# Patient Record
Sex: FEMALE | Race: BLACK OR AFRICAN AMERICAN | URBAN - METROPOLITAN AREA
[De-identification: names, ages, dates, MRNs, and addresses within clinical notes are randomized per-mention and may not be internally consistent; named-entity substitution may affect disease eponyms.]

---

## 2017-04-06 ENCOUNTER — HISTORICAL (OUTPATIENT)
Dept: ADMINISTRATIVE | Facility: HOSPITAL | Age: 82
End: 2017-04-06

## 2018-04-09 ENCOUNTER — HISTORICAL (OUTPATIENT)
Dept: ADMINISTRATIVE | Facility: HOSPITAL | Age: 83
End: 2018-04-09

## 2018-04-09 LAB
ABS NEUT (OLG): 5.91 X10(3)/MCL (ref 2.1–9.2)
ALBUMIN SERPL-MCNC: 3.9 GM/DL (ref 3.4–5)
ALBUMIN/GLOB SERPL: 1.3 RATIO (ref 1.1–2)
ALP SERPL-CCNC: 67 UNIT/L (ref 38–126)
ALT SERPL-CCNC: 24 UNIT/L (ref 12–78)
AST SERPL-CCNC: 24 UNIT/L (ref 15–37)
BASOPHILS # BLD AUTO: 0 X10(3)/MCL (ref 0–0.2)
BASOPHILS NFR BLD AUTO: 0 %
BILIRUB SERPL-MCNC: 0.5 MG/DL (ref 0.2–1)
BILIRUBIN DIRECT+TOT PNL SERPL-MCNC: 0.1 MG/DL (ref 0–0.5)
BILIRUBIN DIRECT+TOT PNL SERPL-MCNC: 0.4 MG/DL (ref 0–0.8)
BUN SERPL-MCNC: 16 MG/DL (ref 7–18)
CALCIUM SERPL-MCNC: 8.9 MG/DL (ref 8.5–10.1)
CHLORIDE SERPL-SCNC: 106 MMOL/L (ref 98–107)
CHOLEST SERPL-MCNC: 191 MG/DL (ref 0–200)
CHOLEST/HDLC SERPL: 2.9 {RATIO} (ref 0–4)
CO2 SERPL-SCNC: 24 MMOL/L (ref 21–32)
CREAT SERPL-MCNC: 1.57 MG/DL (ref 0.55–1.02)
EOSINOPHIL # BLD AUTO: 0.1 X10(3)/MCL (ref 0–0.9)
EOSINOPHIL NFR BLD AUTO: 2 %
ERYTHROCYTE [DISTWIDTH] IN BLOOD BY AUTOMATED COUNT: 14.6 % (ref 11.5–17)
FOLATE SERPL-MCNC: 8 NG/ML (ref 3.1–17.5)
GLOBULIN SER-MCNC: 3.1 GM/DL (ref 2.4–3.5)
GLUCOSE SERPL-MCNC: 103 MG/DL (ref 74–106)
HCT VFR BLD AUTO: 41 % (ref 37–47)
HDLC SERPL-MCNC: 66 MG/DL (ref 35–60)
HGB BLD-MCNC: 12.8 GM/DL (ref 12–16)
IRON SATN MFR SERPL: 34.1 % (ref 20–50)
IRON SERPL-MCNC: 91 MCG/DL (ref 50–175)
LDLC SERPL CALC-MCNC: 104 MG/DL (ref 0–129)
LYMPHOCYTES # BLD AUTO: 2.4 X10(3)/MCL (ref 0.6–4.6)
LYMPHOCYTES NFR BLD AUTO: 27 %
MCH RBC QN AUTO: 28 PG (ref 27–31)
MCHC RBC AUTO-ENTMCNC: 31.2 GM/DL (ref 33–36)
MCV RBC AUTO: 89.7 FL (ref 80–94)
MONOCYTES # BLD AUTO: 0.4 X10(3)/MCL (ref 0.1–1.3)
MONOCYTES NFR BLD AUTO: 4 %
NEUTROPHILS # BLD AUTO: 5.91 X10(3)/MCL (ref 2.1–9.2)
NEUTROPHILS NFR BLD AUTO: 66 %
PLATELET # BLD AUTO: 293 X10(3)/MCL (ref 130–400)
PMV BLD AUTO: 9.4 FL (ref 9.4–12.4)
POTASSIUM SERPL-SCNC: 4.1 MMOL/L (ref 3.5–5.1)
PROT SERPL-MCNC: 7 GM/DL (ref 6.4–8.2)
RBC # BLD AUTO: 4.57 X10(6)/MCL (ref 4.2–5.4)
SODIUM SERPL-SCNC: 139 MMOL/L (ref 136–145)
TIBC SERPL-MCNC: 267 MCG/DL (ref 250–450)
TRANSFERRIN SERPL-MCNC: 233 MG/DL (ref 200–360)
TRIGL SERPL-MCNC: 106 MG/DL (ref 30–150)
VIT B12 SERPL-MCNC: 1152 PG/ML (ref 193–986)
VLDLC SERPL CALC-MCNC: 21 MG/DL
WBC # SPEC AUTO: 9 X10(3)/MCL (ref 4.5–11.5)

## 2018-05-03 ENCOUNTER — HISTORICAL (OUTPATIENT)
Dept: ADMINISTRATIVE | Facility: HOSPITAL | Age: 83
End: 2018-05-03

## 2018-05-03 LAB
APPEARANCE, UA: CLEAR
BACTERIA #/AREA URNS AUTO: ABNORMAL /HPF
BILIRUB UR QL STRIP: NEGATIVE
COLOR UR: YELLOW
GLUCOSE (UA): NEGATIVE
HGB UR QL STRIP: ABNORMAL
KETONES UR QL STRIP: NEGATIVE
LEUKOCYTE ESTERASE UR QL STRIP: ABNORMAL
NITRITE UR QL STRIP.AUTO: NEGATIVE
PH UR STRIP: 6 [PH] (ref 5–9)
PROT UR QL STRIP: NEGATIVE
RBC #/AREA URNS HPF: ABNORMAL /[HPF]
SP GR UR STRIP: 1 (ref 1–1.03)
SQUAMOUS EPITHELIAL, UA: ABNORMAL
UROBILINOGEN UR STRIP-ACNC: 0.2
WBC #/AREA URNS AUTO: ABNORMAL /[HPF]

## 2018-06-13 ENCOUNTER — HISTORICAL (OUTPATIENT)
Dept: ADMINISTRATIVE | Facility: HOSPITAL | Age: 83
End: 2018-06-13

## 2018-06-14 ENCOUNTER — HISTORICAL (OUTPATIENT)
Dept: ADMINISTRATIVE | Facility: HOSPITAL | Age: 83
End: 2018-06-14

## 2018-06-14 LAB
APPEARANCE, UA: CLEAR
BACTERIA #/AREA URNS AUTO: ABNORMAL /HPF
BILIRUB UR QL STRIP: NEGATIVE
COLOR UR: YELLOW
GLUCOSE (UA): NEGATIVE
HGB UR QL STRIP: NEGATIVE
KETONES UR QL STRIP: NEGATIVE
LEUKOCYTE ESTERASE UR QL STRIP: ABNORMAL
NITRITE UR QL STRIP.AUTO: NEGATIVE
PH UR STRIP: 5.5 [PH] (ref 5–9)
PROT UR QL STRIP: NEGATIVE
RBC #/AREA URNS HPF: 2 /HPF (ref 0–2)
SP GR UR STRIP: 1.01 (ref 1–1.03)
SQUAMOUS EPITHELIAL, UA: 4 /HPF (ref 0–4)
UROBILINOGEN UR STRIP-ACNC: 0.2
WBC #/AREA URNS AUTO: 8 /HPF (ref 0–3)

## 2018-06-16 LAB — FINAL CULTURE: NO GROWTH

## 2018-08-03 ENCOUNTER — HISTORICAL (OUTPATIENT)
Dept: ADMINISTRATIVE | Facility: HOSPITAL | Age: 83
End: 2018-08-03

## 2018-08-03 LAB
APPEARANCE, UA: CLEAR
BILIRUB UR QL STRIP: NEGATIVE
COLOR UR: YELLOW
GLUCOSE (UA): NEGATIVE
HGB UR QL STRIP: NEGATIVE
KETONES UR QL STRIP: NEGATIVE
LEUKOCYTE ESTERASE UR QL STRIP: NEGATIVE
NITRITE UR QL STRIP: NEGATIVE
PH UR STRIP: 6.5 [PH]
PROT UR QL STRIP: NEGATIVE
SP GR UR STRIP: 1.01
UROBILINOGEN UR STRIP-ACNC: 0.2 EU/DL

## 2018-08-06 LAB — FINAL CULTURE: NO GROWTH

## 2018-12-06 ENCOUNTER — HISTORICAL (OUTPATIENT)
Dept: ADMINISTRATIVE | Facility: HOSPITAL | Age: 83
End: 2018-12-06

## 2018-12-06 LAB
ABS NEUT (OLG): 2.48 X10(3)/MCL (ref 2.1–9.2)
ALBUMIN SERPL-MCNC: 3.7 GM/DL (ref 3.4–5)
ALBUMIN/GLOB SERPL: 1.4 RATIO (ref 1.1–2)
ALP SERPL-CCNC: 53 UNIT/L (ref 38–126)
ALT SERPL-CCNC: 29 UNIT/L (ref 12–78)
AST SERPL-CCNC: 28 UNIT/L (ref 15–37)
BASOPHILS # BLD AUTO: 0.1 X10(3)/MCL (ref 0–0.2)
BASOPHILS NFR BLD AUTO: 1 %
BILIRUB SERPL-MCNC: 0.3 MG/DL (ref 0.2–1)
BILIRUBIN DIRECT+TOT PNL SERPL-MCNC: 0.1 MG/DL (ref 0–0.5)
BILIRUBIN DIRECT+TOT PNL SERPL-MCNC: 0.2 MG/DL (ref 0–0.8)
BUN SERPL-MCNC: 21 MG/DL (ref 7–18)
CALCIUM SERPL-MCNC: 8.7 MG/DL (ref 8.5–10.1)
CHLORIDE SERPL-SCNC: 107 MMOL/L (ref 98–107)
CHOLEST SERPL-MCNC: 207 MG/DL (ref 0–200)
CHOLEST/HDLC SERPL: 3.6 {RATIO} (ref 0–4)
CO2 SERPL-SCNC: 28 MMOL/L (ref 21–32)
CREAT SERPL-MCNC: 1.32 MG/DL (ref 0.55–1.02)
EOSINOPHIL # BLD AUTO: 0.3 X10(3)/MCL (ref 0–0.9)
EOSINOPHIL NFR BLD AUTO: 5 %
ERYTHROCYTE [DISTWIDTH] IN BLOOD BY AUTOMATED COUNT: 14.1 % (ref 11.5–17)
GLOBULIN SER-MCNC: 2.6 GM/DL (ref 2.4–3.5)
GLUCOSE SERPL-MCNC: 80 MG/DL (ref 74–106)
HCT VFR BLD AUTO: 38.9 % (ref 37–47)
HDLC SERPL-MCNC: 58 MG/DL (ref 35–60)
HGB BLD-MCNC: 11.8 GM/DL (ref 12–16)
IRON SATN MFR SERPL: 42.5 % (ref 20–50)
IRON SERPL-MCNC: 97 MCG/DL (ref 50–175)
LDLC SERPL CALC-MCNC: 125 MG/DL (ref 0–129)
LYMPHOCYTES # BLD AUTO: 3.4 X10(3)/MCL (ref 0.6–4.6)
LYMPHOCYTES NFR BLD AUTO: 52 %
MCH RBC QN AUTO: 28 PG (ref 27–31)
MCHC RBC AUTO-ENTMCNC: 30.3 GM/DL (ref 33–36)
MCV RBC AUTO: 92.4 FL (ref 80–94)
MONOCYTES # BLD AUTO: 0.4 X10(3)/MCL (ref 0.1–1.3)
MONOCYTES NFR BLD AUTO: 5 %
NEUTROPHILS # BLD AUTO: 2.48 X10(3)/MCL (ref 2.1–9.2)
NEUTROPHILS NFR BLD AUTO: 37 %
PLATELET # BLD AUTO: 195 X10(3)/MCL (ref 130–400)
PMV BLD AUTO: 9.7 FL (ref 9.4–12.4)
POTASSIUM SERPL-SCNC: 4.3 MMOL/L (ref 3.5–5.1)
PROT SERPL-MCNC: 6.3 GM/DL (ref 6.4–8.2)
RBC # BLD AUTO: 4.21 X10(6)/MCL (ref 4.2–5.4)
SODIUM SERPL-SCNC: 141 MMOL/L (ref 136–145)
TIBC SERPL-MCNC: 228 MCG/DL (ref 250–450)
TRANSFERRIN SERPL-MCNC: 175 MG/DL (ref 200–360)
TRIGL SERPL-MCNC: 118 MG/DL (ref 30–150)
TSH SERPL-ACNC: 3.05 MIU/L (ref 0.36–3.74)
VLDLC SERPL CALC-MCNC: 24 MG/DL
WBC # SPEC AUTO: 6.7 X10(3)/MCL (ref 4.5–11.5)

## 2019-02-04 ENCOUNTER — HISTORICAL (OUTPATIENT)
Dept: LAB | Facility: HOSPITAL | Age: 84
End: 2019-02-04

## 2019-02-04 LAB
APPEARANCE, UA: CLEAR
BACTERIA SPEC CULT: ABNORMAL
BILIRUB UR QL STRIP: NEGATIVE
COLOR UR: YELLOW
GLUCOSE (UA): NEGATIVE
HGB UR QL STRIP: NEGATIVE
KETONES UR QL STRIP: NEGATIVE
LEUKOCYTE ESTERASE UR QL STRIP: ABNORMAL
NITRITE UR QL STRIP: NEGATIVE
PH UR STRIP: 7.5 [PH] (ref 4.6–8)
PROT UR QL STRIP: NEGATIVE
RBC #/AREA URNS HPF: ABNORMAL /[HPF]
SP GR UR STRIP: 1.01 (ref 1–1.03)
SQUAMOUS EPITHELIAL, UA: ABNORMAL
UROBILINOGEN UR STRIP-ACNC: 0.2
WBC #/AREA URNS HPF: ABNORMAL /HPF

## 2020-08-12 ENCOUNTER — HISTORICAL (OUTPATIENT)
Dept: ADMINISTRATIVE | Facility: HOSPITAL | Age: 85
End: 2020-08-12

## 2020-08-12 LAB
ABS NEUT (OLG): 2.28 X10(3)/MCL (ref 2.1–9.2)
ALBUMIN SERPL-MCNC: 3.5 GM/DL (ref 3.4–4.8)
ALBUMIN/GLOB SERPL: 1.5 RATIO (ref 1.1–2)
ALP SERPL-CCNC: 45 UNIT/L (ref 40–150)
ALT SERPL-CCNC: 10 UNIT/L (ref 0–55)
APPEARANCE, UA: CLEAR
AST SERPL-CCNC: 20 UNIT/L (ref 5–34)
BACTERIA SPEC CULT: ABNORMAL /HPF
BASOPHILS # BLD AUTO: 0 X10(3)/MCL (ref 0–0.2)
BASOPHILS NFR BLD AUTO: 1 %
BILIRUB SERPL-MCNC: 0.3 MG/DL
BILIRUB UR QL STRIP: NEGATIVE
BILIRUBIN DIRECT+TOT PNL SERPL-MCNC: 0.1 MG/DL (ref 0–0.5)
BILIRUBIN DIRECT+TOT PNL SERPL-MCNC: 0.2 MG/DL (ref 0–0.8)
BUN SERPL-MCNC: 18.7 MG/DL (ref 9.8–20.1)
CALCIUM SERPL-MCNC: 8.5 MG/DL (ref 8.4–10.2)
CHLORIDE SERPL-SCNC: 106 MMOL/L (ref 98–111)
CHOLEST SERPL-MCNC: 213 MG/DL
CHOLEST/HDLC SERPL: 5 {RATIO} (ref 0–5)
CO2 SERPL-SCNC: 26 MMOL/L (ref 23–31)
COLOR UR: YELLOW
CREAT SERPL-MCNC: 1.53 MG/DL (ref 0.55–1.02)
EOSINOPHIL # BLD AUTO: 0.2 X10(3)/MCL (ref 0–0.9)
EOSINOPHIL NFR BLD AUTO: 3 %
ERYTHROCYTE [DISTWIDTH] IN BLOOD BY AUTOMATED COUNT: 13.3 % (ref 11.5–17)
GLOBULIN SER-MCNC: 2.3 GM/DL (ref 2.4–3.5)
GLUCOSE (UA): NEGATIVE
GLUCOSE SERPL-MCNC: 68 MG/DL (ref 75–121)
HCT VFR BLD AUTO: 35.5 % (ref 37–47)
HDLC SERPL-MCNC: 44 MG/DL (ref 35–60)
HGB BLD-MCNC: 10.7 GM/DL (ref 12–16)
HGB UR QL STRIP: ABNORMAL
KETONES UR QL STRIP: NEGATIVE
LDLC SERPL CALC-MCNC: 153 MG/DL (ref 50–140)
LEUKOCYTE ESTERASE UR QL STRIP: ABNORMAL
LYMPHOCYTES # BLD AUTO: 3.4 X10(3)/MCL (ref 0.6–4.6)
LYMPHOCYTES NFR BLD AUTO: 54 %
MCH RBC QN AUTO: 28.5 PG (ref 27–31)
MCHC RBC AUTO-ENTMCNC: 30.1 GM/DL (ref 33–36)
MCV RBC AUTO: 94.4 FL (ref 80–94)
MONOCYTES # BLD AUTO: 0.4 X10(3)/MCL (ref 0.1–1.3)
MONOCYTES NFR BLD AUTO: 6 %
NEUTROPHILS # BLD AUTO: 2.28 X10(3)/MCL (ref 2.1–9.2)
NEUTROPHILS NFR BLD AUTO: 36 %
NITRITE UR QL STRIP: NEGATIVE
PH UR STRIP: 5.5 [PH] (ref 5–9)
PLATELET # BLD AUTO: 184 X10(3)/MCL (ref 130–400)
PMV BLD AUTO: 10.4 FL (ref 9.4–12.4)
POTASSIUM SERPL-SCNC: 4.7 MMOL/L (ref 3.5–5.1)
PROT SERPL-MCNC: 5.8 GM/DL (ref 5.8–7.6)
PROT UR QL STRIP: NEGATIVE
RBC # BLD AUTO: 3.76 X10(6)/MCL (ref 4.2–5.4)
RBC #/AREA URNS HPF: 6 /HPF (ref 0–2)
SODIUM SERPL-SCNC: 139 MMOL/L (ref 132–146)
SP GR UR STRIP: 1.02 (ref 1–1.03)
SQUAMOUS EPITHELIAL, UA: ABNORMAL
TRIGL SERPL-MCNC: 79 MG/DL (ref 37–140)
UROBILINOGEN UR STRIP-ACNC: 0.2
VLDLC SERPL CALC-MCNC: 16 MG/DL
WBC # SPEC AUTO: 6.2 X10(3)/MCL (ref 4.5–11.5)
WBC #/AREA URNS HPF: 19 /HPF (ref 0–3)

## 2020-08-14 LAB — FINAL CULTURE: NORMAL

## 2021-02-06 ENCOUNTER — HISTORICAL (OUTPATIENT)
Dept: ADMINISTRATIVE | Facility: HOSPITAL | Age: 86
End: 2021-02-06

## 2021-02-06 LAB
APPEARANCE, UA: CLEAR
BACTERIA #/AREA URNS AUTO: ABNORMAL /HPF
BILIRUB UR QL STRIP: NEGATIVE
COLOR UR: YELLOW
GLUCOSE (UA): NEGATIVE
HGB UR QL STRIP: NEGATIVE
KETONES UR QL STRIP: NEGATIVE
LEUKOCYTE ESTERASE UR QL STRIP: ABNORMAL
NITRITE UR QL STRIP.AUTO: NEGATIVE
PH UR STRIP: 5 [PH] (ref 5–9)
PROT UR QL STRIP: NEGATIVE
RBC #/AREA URNS HPF: ABNORMAL /[HPF]
SP GR UR STRIP: 1.02 (ref 1–1.03)
SQUAMOUS EPITHELIAL, UA: ABNORMAL
UROBILINOGEN UR STRIP-ACNC: 0.2
WBC #/AREA URNS AUTO: ABNORMAL /HPF (ref 0–3)

## 2021-02-08 LAB — FINAL CULTURE: NO GROWTH

## 2021-04-03 ENCOUNTER — HISTORICAL (OUTPATIENT)
Dept: ADMINISTRATIVE | Facility: HOSPITAL | Age: 86
End: 2021-04-03

## 2021-04-03 LAB
APPEARANCE, UA: NORMAL
BACTERIA SPEC CULT: NORMAL /HPF
BILIRUB UR QL STRIP: NEGATIVE
COLOR UR: YELLOW
GLUCOSE (UA): NEGATIVE
HGB UR QL STRIP: NEGATIVE
KETONES UR QL STRIP: NEGATIVE
LEUKOCYTE ESTERASE UR QL STRIP: NEGATIVE
NITRITE UR QL STRIP: NEGATIVE
PH UR STRIP: 6 [PH] (ref 5–9)
PROT UR QL STRIP: NEGATIVE
RBC #/AREA URNS HPF: NORMAL /[HPF]
SP GR UR STRIP: 1.02 (ref 1–1.03)
SQUAMOUS EPITHELIAL, UA: NORMAL /HPF
UROBILINOGEN UR STRIP-ACNC: 0.2
WBC #/AREA URNS HPF: NORMAL /[HPF]

## 2021-04-05 LAB — FINAL CULTURE: NO GROWTH

## 2024-12-10 ENCOUNTER — TELEPHONE (OUTPATIENT)
Dept: FAMILY MEDICINE | Facility: CLINIC | Age: 89
End: 2024-12-10

## 2024-12-10 ENCOUNTER — OFFICE VISIT (OUTPATIENT)
Dept: FAMILY MEDICINE | Facility: CLINIC | Age: 89
End: 2024-12-10
Payer: MEDICARE

## 2024-12-10 ENCOUNTER — HOSPITAL ENCOUNTER (INPATIENT)
Facility: HOSPITAL | Age: 89
LOS: 3 days | Discharge: HOME | DRG: 603 | End: 2024-12-14
Attending: STUDENT IN AN ORGANIZED HEALTH CARE EDUCATION/TRAINING PROGRAM | Admitting: STUDENT IN AN ORGANIZED HEALTH CARE EDUCATION/TRAINING PROGRAM
Payer: MEDICARE

## 2024-12-10 VITALS
RESPIRATION RATE: 18 BRPM | OXYGEN SATURATION: 99 % | SYSTOLIC BLOOD PRESSURE: 154 MMHG | HEART RATE: 61 BPM | DIASTOLIC BLOOD PRESSURE: 72 MMHG | TEMPERATURE: 98 F | WEIGHT: 120 LBS | BODY MASS INDEX: 23.56 KG/M2 | HEIGHT: 60 IN

## 2024-12-10 DIAGNOSIS — G30.1 LATE ONSET ALZHEIMER DEMENTIA, UNSPECIFIED DEMENTIA SEVERITY, UNSPECIFIED WHETHER BEHAVIORAL, PSYCHOTIC, OR MOOD DISTURBANCE OR ANXIETY: ICD-10-CM

## 2024-12-10 DIAGNOSIS — M79.661 PAIN AND SWELLING OF RIGHT LOWER LEG: ICD-10-CM

## 2024-12-10 DIAGNOSIS — L03.115 CELLULITIS OF RIGHT FOOT: Primary | ICD-10-CM

## 2024-12-10 DIAGNOSIS — Z13.6 ENCOUNTER FOR LIPID SCREENING FOR CARDIOVASCULAR DISEASE: ICD-10-CM

## 2024-12-10 DIAGNOSIS — Z13.220 ENCOUNTER FOR LIPID SCREENING FOR CARDIOVASCULAR DISEASE: ICD-10-CM

## 2024-12-10 DIAGNOSIS — F02.80 LATE ONSET ALZHEIMER DEMENTIA, UNSPECIFIED DEMENTIA SEVERITY, UNSPECIFIED WHETHER BEHAVIORAL, PSYCHOTIC, OR MOOD DISTURBANCE OR ANXIETY: ICD-10-CM

## 2024-12-10 DIAGNOSIS — E87.5 HYPERKALEMIA: ICD-10-CM

## 2024-12-10 DIAGNOSIS — Z00.00 WELLNESS EXAMINATION: ICD-10-CM

## 2024-12-10 DIAGNOSIS — Z76.89 ENCOUNTER TO ESTABLISH CARE: ICD-10-CM

## 2024-12-10 DIAGNOSIS — I10 BENIGN ESSENTIAL HTN: ICD-10-CM

## 2024-12-10 DIAGNOSIS — Z13.29 SCREENING FOR THYROID DISORDER: ICD-10-CM

## 2024-12-10 DIAGNOSIS — M79.89 PAIN AND SWELLING OF RIGHT LOWER LEG: ICD-10-CM

## 2024-12-10 DIAGNOSIS — N18.4 STAGE 4 CHRONIC KIDNEY DISEASE: ICD-10-CM

## 2024-12-10 DIAGNOSIS — Z13.1 ENCOUNTER FOR SCREENING FOR DIABETES MELLITUS: ICD-10-CM

## 2024-12-10 DIAGNOSIS — M79.604 RIGHT LEG PAIN: ICD-10-CM

## 2024-12-10 DIAGNOSIS — E78.2 MIXED HYPERLIPIDEMIA: ICD-10-CM

## 2024-12-10 DIAGNOSIS — L03.115 CELLULITIS OF FOOT, RIGHT: ICD-10-CM

## 2024-12-10 PROBLEM — G30.9 ALZHEIMER'S DEMENTIA: Status: ACTIVE | Noted: 2024-12-10

## 2024-12-10 LAB
ALBUMIN SERPL-MCNC: 3.9 G/DL (ref 3.4–4.8)
ALBUMIN/GLOB SERPL: 1.2 RATIO (ref 1.1–2)
ALP SERPL-CCNC: 69 UNIT/L (ref 40–150)
ALT SERPL-CCNC: 9 UNIT/L (ref 0–55)
ANION GAP SERPL CALC-SCNC: 4 MEQ/L
AST SERPL-CCNC: 25 UNIT/L (ref 5–34)
BASOPHILS # BLD AUTO: 0.04 X10(3)/MCL
BASOPHILS NFR BLD AUTO: 0.5 %
BILIRUB SERPL-MCNC: 0.3 MG/DL
BUN SERPL-MCNC: 34.2 MG/DL (ref 9.8–20.1)
CALCIUM SERPL-MCNC: 8 MG/DL (ref 8.4–10.2)
CHLORIDE SERPL-SCNC: 110 MMOL/L (ref 98–111)
CO2 SERPL-SCNC: 24 MMOL/L (ref 23–31)
CREAT SERPL-MCNC: 1.96 MG/DL (ref 0.55–1.02)
CREAT/UREA NIT SERPL: 17
EOSINOPHIL # BLD AUTO: 0.12 X10(3)/MCL (ref 0–0.9)
EOSINOPHIL NFR BLD AUTO: 1.6 %
ERYTHROCYTE [DISTWIDTH] IN BLOOD BY AUTOMATED COUNT: 13.6 % (ref 11.5–17)
GFR SERPLBLD CREATININE-BSD FMLA CKD-EPI: 23 ML/MIN/1.73/M2
GLOBULIN SER-MCNC: 3.2 GM/DL (ref 2.4–3.5)
GLUCOSE SERPL-MCNC: 78 MG/DL (ref 75–121)
HCT VFR BLD AUTO: 33.9 % (ref 37–47)
HGB BLD-MCNC: 10.3 G/DL (ref 12–16)
IMM GRANULOCYTES # BLD AUTO: 0.02 X10(3)/MCL (ref 0–0.04)
IMM GRANULOCYTES NFR BLD AUTO: 0.3 %
LACTATE SERPL-SCNC: 0.9 MMOL/L (ref 0.5–2.2)
LYMPHOCYTES # BLD AUTO: 2.65 X10(3)/MCL (ref 0.6–4.6)
LYMPHOCYTES NFR BLD AUTO: 34.6 %
MCH RBC QN AUTO: 28.2 PG (ref 27–31)
MCHC RBC AUTO-ENTMCNC: 30.4 G/DL (ref 33–36)
MCV RBC AUTO: 92.9 FL (ref 80–94)
MONOCYTES # BLD AUTO: 0.54 X10(3)/MCL (ref 0.1–1.3)
MONOCYTES NFR BLD AUTO: 7 %
NEUTROPHILS # BLD AUTO: 4.3 X10(3)/MCL (ref 2.1–9.2)
NEUTROPHILS NFR BLD AUTO: 56 %
NRBC BLD AUTO-RTO: 0 %
PLATELET # BLD AUTO: 217 X10(3)/MCL (ref 130–400)
PMV BLD AUTO: 10 FL (ref 7.4–10.4)
POTASSIUM SERPL-SCNC: 4.6 MMOL/L (ref 3.5–5.1)
PROT SERPL-MCNC: 7.1 GM/DL (ref 5.8–7.6)
RBC # BLD AUTO: 3.65 X10(6)/MCL (ref 4.2–5.4)
SODIUM SERPL-SCNC: 138 MMOL/L (ref 136–145)
WBC # BLD AUTO: 7.67 X10(3)/MCL (ref 4.5–11.5)

## 2024-12-10 PROCEDURE — 87040 BLOOD CULTURE FOR BACTERIA: CPT | Performed by: PHYSICIAN ASSISTANT

## 2024-12-10 PROCEDURE — 25000003 PHARM REV CODE 250: Performed by: STUDENT IN AN ORGANIZED HEALTH CARE EDUCATION/TRAINING PROGRAM

## 2024-12-10 PROCEDURE — G0439 PPPS, SUBSEQ VISIT: HCPCS | Mod: ,,, | Performed by: STUDENT IN AN ORGANIZED HEALTH CARE EDUCATION/TRAINING PROGRAM

## 2024-12-10 PROCEDURE — G0378 HOSPITAL OBSERVATION PER HR: HCPCS

## 2024-12-10 PROCEDURE — 63600175 PHARM REV CODE 636 W HCPCS: Performed by: STUDENT IN AN ORGANIZED HEALTH CARE EDUCATION/TRAINING PROGRAM

## 2024-12-10 PROCEDURE — 80053 COMPREHEN METABOLIC PANEL: CPT | Performed by: PHYSICIAN ASSISTANT

## 2024-12-10 PROCEDURE — 96374 THER/PROPH/DIAG INJ IV PUSH: CPT

## 2024-12-10 PROCEDURE — 83605 ASSAY OF LACTIC ACID: CPT | Performed by: PHYSICIAN ASSISTANT

## 2024-12-10 PROCEDURE — 85025 COMPLETE CBC W/AUTO DIFF WBC: CPT | Performed by: PHYSICIAN ASSISTANT

## 2024-12-10 PROCEDURE — 99285 EMERGENCY DEPT VISIT HI MDM: CPT | Mod: 25

## 2024-12-10 RX ORDER — ACETAMINOPHEN 325 MG/1
650 TABLET ORAL EVERY 6 HOURS PRN
Status: DISCONTINUED | OUTPATIENT
Start: 2024-12-10 | End: 2024-12-14 | Stop reason: HOSPADM

## 2024-12-10 RX ORDER — SULFAMETHOXAZOLE AND TRIMETHOPRIM 200; 40 MG/5ML; MG/5ML
8 SUSPENSION ORAL EVERY 12 HOURS
Status: ON HOLD | COMMUNITY
End: 2024-12-14 | Stop reason: HOSPADM

## 2024-12-10 RX ORDER — CLINDAMYCIN HYDROCHLORIDE 150 MG/1
150 CAPSULE ORAL 3 TIMES DAILY
Status: ON HOLD | COMMUNITY
End: 2024-12-14 | Stop reason: HOSPADM

## 2024-12-10 RX ORDER — ONDANSETRON HYDROCHLORIDE 2 MG/ML
4 INJECTION, SOLUTION INTRAVENOUS EVERY 4 HOURS PRN
Status: DISCONTINUED | OUTPATIENT
Start: 2024-12-10 | End: 2024-12-14 | Stop reason: HOSPADM

## 2024-12-10 RX ORDER — CEFAZOLIN 2 G/1
2 INJECTION, POWDER, FOR SOLUTION INTRAMUSCULAR; INTRAVENOUS
Status: COMPLETED | OUTPATIENT
Start: 2024-12-10 | End: 2024-12-10

## 2024-12-10 RX ADMIN — CEFAZOLIN 2 G: 2 INJECTION, POWDER, FOR SOLUTION INTRAMUSCULAR; INTRAVENOUS at 01:12

## 2024-12-10 RX ADMIN — VANCOMYCIN HYDROCHLORIDE 1000 MG: 1 INJECTION, POWDER, LYOPHILIZED, FOR SOLUTION INTRAVENOUS at 02:12

## 2024-12-10 NOTE — FIRST PROVIDER EVALUATION
Medical screening examination initiated.  I have conducted a focused provider triage encounter, findings are as follows:    Brief history of present illness:  89-year-old female presents to ED for evaluation right foot pain and swelling worsening over the last 2 weeks.  Patient started antibiotics on José Manuel than.  Saw her PCP today and sent to ED evaluation.    Vitals:    12/10/24 1033   BP: (!) 148/72   BP Location: Left arm   Pulse: (!) 58   Resp: 18   Temp: 97.8 °F (36.6 °C)   TempSrc: Oral   SpO2: 100%   Weight: 54.4 kg (120 lb)   Height: 5' (1.524 m)       Pertinent physical exam:  Patient awake sitting wheelchair.    Brief workup plan:  Labs, blood cultures,, ultrasound    Preliminary workup initiated; this workup will be continued and followed by the physician or advanced practice provider that is assigned to the patient when roomed.

## 2024-12-10 NOTE — TELEPHONE ENCOUNTER
Called [facility] and talked to BEATRICE Cruz. I voiced pt is on her way for failed outpatient therapy of Cellulitis and stage 4 CKD. I also voiced her blood pressure from this morning and pulse. She voiced understanding and thanks!

## 2024-12-10 NOTE — PROGRESS NOTES
"Pharmacokinetic Initial Assessment: IV Vancomycin    Assessment/Plan:    Initiate intravenous vancomycin with loading dose of 1000 mg once with subsequent doses when random concentrations are less than 20 mcg/mL  Desired empiric serum trough concentration is 10 to 20 mcg/mL  Draw vancomycin random level on 12/11 at 0430.  Pharmacy will continue to follow and monitor vancomycin.      Please contact pharmacy at extension [phone] with any questions regarding this assessment.     Thank you for the consult,   Yola Aubrey       Patient brief summary:  Loly Ramirez is a 89 y.o. female initiated on antimicrobial therapy with IV Vancomycin for treatment of suspected skin & soft tissue infection    Drug Allergies:   Review of patient's allergies indicates:   Allergen Reactions    Penicillins        Actual Body Weight:   54.4 kg    Renal Function:   Estimated Creatinine Clearance: 12.3 mL/min (A) (based on SCr of 1.96 mg/dL (H)).,     Dialysis Method (if applicable):  CHLOE    CBC (last 72 hours):  Recent Labs   Lab Result Units 12/10/24  1055   WBC x10(3)/mcL 7.67   Hgb g/dL 10.3*   Hct % 33.9*   Platelet x10(3)/mcL 217   Mono % % 7.0   Eos % % 1.6   Basophil % % 0.5       Metabolic Panel (last 72 hours):  Recent Labs   Lab Result Units 12/10/24  1055   Sodium mmol/L 138   Potassium mmol/L 4.6   Chloride mmol/L 110   CO2 mmol/L 24   Glucose mg/dL 78   Blood Urea Nitrogen mg/dL 34.2*   Creatinine mg/dL 1.96*   Albumin g/dL 3.9   Bilirubin Total mg/dL 0.3   ALP unit/L 69   AST unit/L 25   ALT unit/L 9       Drug levels (last 3 results):  No results for input(s): "VANCOMYCINRA", "VANCORANDOM", "VANCOMYCINPE", "VANCOPEAK", "VANCOMYCINTR", "VANCOTROUGH" in the last 72 hours.    Microbiologic Results:  Microbiology Results (last 7 days)       Procedure Component Value Units Date/Time    Blood culture #1 **CANNOT BE ORDERED STAT** [[id number]] Collected: 12/10/24 1072    Order Status: Resulted Specimen: Blood Updated: 12/10/24 1230    " Blood culture #2 **CANNOT BE ORDERED STAT** [[id number]] Collected: 12/10/24 1139    Order Status: Resulted Specimen: Blood Updated: 12/10/24 1206

## 2024-12-10 NOTE — PROGRESS NOTES
Patient ID: [id number]     Chief Complaint: Establish Care        HPI:      Disclaimer:  This note is prepared using voice recognition software and as such is likely to have errors despite attempts at proofreading. Please contact me for questions.     Loly Ramirez is a 89 y.o. female here today for a Medicare Wellness.     A separate E/M code has been provided to evaluate additional complaints that the patient would like addressed during the dedicated Medicare Wellness Exam.    The patient has following issues to discuss    Acute Issues-  Patient is here to establish care.  She comes along with the daughter Ms. Germain.      Right lower extremity swelling  Swelling since past 2 weeks.  She visited emergency department on 12/08/2024 if she was diagnosed as cellulitis and was prescribed antibiotics.  Patient has been noncompliant with antibiotics.  She visited to ER today and she was giving IV antibiotics and was recommended an outpatient set up for IV infusions.  Daughter reports that transportation is an issue and also mother is noncompliant with medications    Hypertension   Not at goal   Supposed to be taking amlodipine and losartan but has not been taking any medication    CKD stage 4   Unaware about the diagnosis    Hyperkalemia  Potassium 5.2, eats banana every day  Asymptomatic    Hyperlipidemia   Diet controlled    Alzheimer's dementia  Was seeing a neurologist but not anymore  Chronic Issues-  -------------------------------------    HTN (hypertension)          Past Surgical History:   Procedure Laterality Date    CHOLECYSTECTOMY      TUBAL LIGATION         Review of patient's allergies indicates:   Allergen Reactions    Penicillins        Current Outpatient Medications   Medication Instructions    clindamycin (CLEOCIN) 150 mg, 3 times daily    sulfamethoxazole-trimethoprim 200-40 mg/5 ml (BACTRIM,SEPTRA) 200-40 mg/5 mL Susp 8 mg/kg/day, Every 12 hours       Social History     Socioeconomic History    Marital  status:    Tobacco Use    Smoking status: Never    Smokeless tobacco: Never   Substance and Sexual Activity    Alcohol use: Never    Drug use: Never    Sexual activity: Not Currently        No family history on file.     No care team member to display       Subjective:     ROS    See HPI for details    Constitutional: Denies Change in appetite. Denies Chills. Denies Fever. Denies Night sweats.  Respiratory: Denies Cough. Denies Shortness of breath. Denies Shortness of breath with exertion. Denies Wheezing.  Cardiovascular: DeniesChest pain at rest. Denies Chest pain with exertion. Denies Irregular heartbeat. Denies Palpitations. Denies Edema.  Gastrointestinal: Denies Abdominal pain. DeniesDiarrhea. Denies Nausea. Denies Vomiting. Denies Hematemesis or Hematochezia.  Integumentary: Denies Rash. Denies Itching. Denies Dry skin.  Neurologic: Denies Dizziness. Denies Fainting. Denies Headache.  Psychiatric: Denies Depression. Denies Anxiety. Denies Suicidal/Homicidal ideations.    All Other ROS: Negative except as stated in HPI.     Objective:     Visit Vitals  BP (!) 154/72 (BP Location: Left arm, Patient Position: Sitting)   Pulse 61   Temp 98.2 °F (36.8 °C) (Oral)   Resp 18   Ht 5' (1.524 m)   Wt 54.4 kg (120 lb)   LMP  (LMP Unknown)   SpO2 99%   BMI 23.44 kg/m²       Physical Exam    General: Alert and oriented, No acute distress.  Neck/Thyroid: Supple, Non-tender  Respiratory: Clear to auscultation bilaterally  Cardiovascular: Regular rate and rhythm  Gastrointestinal: Soft, Non-tender. No palpable organomegaly.  Musculoskeletal:   Wheelchair-bound   Right lower extremity warm, erythematous swollen, tender    Assessment:       ICD-10-CM ICD-9-CM   1. Wellness examination  Z00.00 V70.0   2. Encounter to establish care  Z76.89 V65.8   3. Cellulitis of foot, right  L03.115 682.7   4. Hyperkalemia  E87.5 276.7   5. Benign essential HTN  I10 401.1   6. Encounter for screening for diabetes mellitus  Z13.1 V77.1    7. Screening for thyroid disorder  Z13.29 V77.0   8. Encounter for lipid screening for cardiovascular disease  Z13.220 V77.91    Z13.6 V81.2   9. Mixed hyperlipidemia  E78.2 272.2   10. Stage 4 chronic kidney disease  N18.4 585.4   11. Late onset Alzheimer dementia, unspecified dementia severity, unspecified whether behavioral, psychotic, or mood disturbance or anxiety  G30.1 331.0    F02.80         Plan:     Medicare Annual Wellness and Personalized Prevention Plan:     A comprehensive HEALTH RISK ASSESSMENT was completed today. Results are summarized below:                  The patient is NOT A TOBACCO USER.        All Questions regarding food, transportation or housing were not answered today.    The patient was asked and declined the use of a free .    Alcohol/Tobacco Use - Stressed importance of smoking cessation and limiting alcohol intake.  Denies of smoking and alcohol intake  CVD Risk Factors - Reviewed as above @  Obesity/Physical Activity - Body mass index is 23.44 kg/m².. Encouraged daily 30 minute physical activity x 5 days per week.    Advance Care Planning  Advance Care Planning   I attest to discussing Advance Care Planning with patient and/or family member.  Education was provided including the importance of the Health Care Power of , Advance Directives, and/or [place] documentation.  The patient expressed understanding to the importance of this information and discussion.  Goals of Care: The patient expressed that what is most important right now is to focus on:   Length of ACP conversation in minutes: 6 min       Advance Care Planning     Date: 12/10/2024    [facility]  I engaged the patient in a voluntary conversation about advance care planning and we specifically addressed what the goals of care would be moving forward, in light of the patient's change in clinical status, specifically when in hospital.  We did specifically address the patient's likely prognosis, which is good .  We  explored the patient's values and preferences for future care.  The patient endorses that what is most important right now is to focus on spending time at home    Accordingly, we have decided that the best plan to meet the patient's goals includes continuing with treatment    A total of 8 min was spent on advance care planning, goals of care discussion, emotional support, formulating and communicating prognosis and exploring burden/benefit of various approaches of treatment. This discussion occurred on a fully voluntary basis with the verbal consent of the patient and/or family.      Full Code  Jessa Ramirez (daughter)  [phone] (Mobile)     Health Maintenance         Date Due Completion Date    COVID-19 Vaccine (1 - 2024-25 season) Never done ---    RSV Vaccine (Age 60+ and Pregnant patients) (1 - 1-dose 75+ series) 12/10/2024 (Originally 1/16/2004) ---    TETANUS VACCINE 12/10/2025 (Originally 1/16/1947) ---    Shingles Vaccine (1 of 2) 12/10/2025 (Originally 1/16/1979) ---    Pneumococcal Vaccines (Age 65+) (2 of 2 - PCV) 12/10/2025 (Originally 11/18/2007) 11/18/2006    Lipid Panel 08/12/2025 8/12/2020               Vaccinations -   Immunization History   Administered Date(s) Administered    DTaP 11/18/2006    Influenza - Trivalent - Afluria, Fluzone MDV 11/03/2010    Pneumococcal Polysaccharide - 23 Valent 11/18/2006        1. Wellness examination  2. Encounter to establish care  AAA Screening -  (65-75)   Lung Cancer-(50-80) Smoker/ Ex smoker- Never smoked  Colon Cancer Screening(45-75) - aged out  Cervical Cancer Screening (21-65)-aged out  Breast Cancer Screening (40-74)- aged out.   Osteoporosis Screening(>65) - aged out  Dental Exam - Recommend biannually    Diet discussed (healthy food choices, reduce portions and overall calorie intake)  Exercise 30-45 minutes 5x per week  Avoid excessive alcohol and tobacco if taking  Immunizations dicussed  Preventative exams discussed.    3. Cellulitis of foot,  right  Recommend the patient for antibiotics as the patient failed outpatient antibiotics  Also has hyperkalemia, CKD stage 4, bradycardia, would benefit from inpatient care.  Risk of cellulitis being untreated explained to the patient in detail  Patient Upset but ready to be treated  -     Uric Acid; Future; Expected date: 12/10/2024    4. Hyperkalemia  Avoid bananas   Blood worked    5. Benign essential HTN  We will need evaluation and blood pressure log to start medications  -     CBC Auto Differential; Future; Expected date: 12/10/2024  -     Comprehensive Metabolic Panel; Future; Expected date: 12/10/2024  -     Urinalysis; Future; Expected date: 12/10/2024  -     Microalbumin/Creatinine Ratio, Urine; Future; Expected date: 12/10/2024  -     Vitamin B12; Future; Expected date: 12/10/2024  -     Folate; Future; Expected date: 12/10/2024    6. Encounter for screening for diabetes mellitus  -     Hemoglobin A1C; Future; Expected date: 12/10/2024    7. Screening for thyroid disorder  -     TSH; Future; Expected date: 12/10/2024    8. Encounter for lipid screening for cardiovascular disease  -     Lipid Panel; Future; Expected date: 12/10/2024    9. Mixed hyperlipidemia  Continue Mediterranean diet    10. Stage 4 chronic kidney disease  Keep goal A1c less than 7, goal blood pressure less than 140/90  Continue Rx as prescribed  Keep scheduled visit with Nephrology  Avoid NSAIDs (Aleve, Mobic, Celebrex, Ibuprofen, Advil, Toradol and Diclofenac).  Only take tylenol occasionally if needed for aches/pains.  Educated on low sodium diet:  Avoid high salt foods (olives, pickles, smoked meats, deli meats, salted potato chips, etc.).   Do not add salt to your food at the table.   Use only small amounts of salt when cooking.   Recommended Daily Protein Intake for chronic kidney disease:  0.8 g/kg   Avoid excessive intake of high potassium foods  Bananas, oranges, watermelon, tomatoes, potatoes, or salt substitutes    -      Vitamin D; Future; Expected date: 12/10/2024  -     Ambulatory referral/consult to Nephrology; Future; Expected date: 12/17/2024    11. Late onset Alzheimer dementia, unspecified dementia severity, unspecified whether behavioral, psychotic, or mood disturbance or anxiety  Declined for neurology referral   Continue to monitor         Medication List with Changes/Refills   Current Medications    CLINDAMYCIN (CLEOCIN) 150 MG CAPSULE    Take 150 mg by mouth 3 (three) times daily.       Start Date: --        End Date: --    SULFAMETHOXAZOLE-TRIMETHOPRIM 200-40 MG/5 ML (BACTRIM,SEPTRA) 200-40 MG/5 ML SUSP    Take 8 mg/kg/day by mouth every 12 (twelve) hours.       Start Date: --        End Date: --          The patient's Health Maintenance was reviewed and the following appears to be due at this time:   Health Maintenance Due   Topic Date Due    COVID-19 Vaccine (1 - 2024-25 season) Never done         Follow up in about 2 weeks (around 12/24/2024) for Hospital visit follow up. 1 AW.   In addition to their scheduled follow up, the patient has also been instructed to follow up on as needed basis.     Provided patient with a 5-10 year written screening schedule and personal prevention plan. Recommendations were developed using the USPSTF age appropriate recommendations. Education, counseling, and referrals were provided as needed. After Visit Summary printed and given to patient which includes a list of additional screenings\tests needed.    No future appointments.

## 2024-12-10 NOTE — Clinical Note
Diagnosis: Cellulitis of right foot [[id number]]   Future Attending Provider: MERRICK LOPEZ [[id number]]   Admit to which facility:: [facility] [[id number]]

## 2024-12-10 NOTE — ED PROVIDER NOTES
Encounter Date: 12/10/2024    SCRIBE #1 NOTE: I, Asael Agee, am scribing for, and in the presence of,  Abel Mcdonald MD. I have scribed the following portions of the note - Other sections scribed: HPI, ROS, PE.       History     Chief Complaint   Patient presents with    Cellulitis     Family member reporting cellulitis to pt's right foot x2 wks, started abx on Sunday without improvement told to come to ER by physician. Denies fever.     Patient is a 89 y.o. female with a PMHx of HTN, alzheimer's, dementia, and CKD presenting to the ED with complaints of foot pain that onset 2 weeks ago. Per patient's daughter, the patient went to the [facility] ER on Sunday and was dx with cellulitis in her right foot. She states the patient was put on IV antibiotic treatment and was prescribed PO clindamycin. Mother reports the patient went back to the [facility] ER this morning and was given a clindamycin injection. Patient's mother states the antibiotics are not working and the patient is still in pain. She notes that the patient's right foot has progressively become more swollen and with worsening pain over the last 2 weeks.     The patient complains of pain in the top of her right foot.    [facility] chart review states patient was given a 300 mg clindamycin injection this morning and says the patient has been refusing to take her medications.     The history is provided by the patient and a relative. No  was used.     Review of patient's allergies indicates:   Allergen Reactions    Penicillins      Past Medical History:   Diagnosis Date    HTN (hypertension)      Past Surgical History:   Procedure Laterality Date    CHOLECYSTECTOMY      TUBAL LIGATION       No family history on file.  Social History     Tobacco Use    Smoking status: Never    Smokeless tobacco: Never   Substance Use Topics    Alcohol use: Never    Drug use: Never     Review of Systems   Constitutional:  Negative for chills and fever.    Musculoskeletal:         Foot pain and swelling       Physical Exam     Initial Vitals [12/10/24 1033]   BP Pulse Resp Temp SpO2   (!) 148/72 (!) 58 18 97.8 °F (36.6 °C) 100 %      MAP       --         Physical Exam    Nursing note and vitals reviewed.  Constitutional: She is not diaphoretic.   HENT:   Head: Normocephalic and atraumatic.   Right Ear: External ear normal.   Left Ear: External ear normal.   Nose: Nose normal.   Eyes: Conjunctivae and EOM are normal. Pupils are equal, round, and reactive to light. Right eye exhibits no discharge. Left eye exhibits no discharge.   Cardiovascular:  Normal rate, regular rhythm, normal heart sounds and intact distal pulses.     Exam reveals no gallop and no friction rub.       No murmur heard.  Pulses:       Dorsalis pedis pulses are 2+ on the left side.   Pulmonary/Chest: Breath sounds normal. No respiratory distress. She has no wheezes. She has no rhonchi. She has no rales. She exhibits no tenderness.   Abdominal: Abdomen is soft. Bowel sounds are normal. She exhibits no distension and no mass. There is no abdominal tenderness. There is no rebound and no guarding.   Musculoskeletal:         General: No edema. Normal range of motion.      Comments: Diffuse swelling to right foot that is worse over the lateral malleolus with no erythema, no calf tenderness     Neurological: She is alert and oriented to person, place, and time. No cranial nerve deficit or sensory deficit.   Skin: Skin is warm and dry. Capillary refill takes less than 2 seconds. No erythema. No pallor.   Right foot warmer than the left         ED Course   Procedures  Labs Reviewed   COMPREHENSIVE METABOLIC PANEL - Abnormal       Result Value    Sodium 138      Potassium 4.6      Chloride 110      CO2 24      Glucose 78      Blood Urea Nitrogen 34.2 (*)     Creatinine 1.96 (*)     Calcium 8.0 (*)     Protein Total 7.1      Albumin 3.9      Globulin 3.2      Albumin/Globulin Ratio 1.2      Bilirubin Total  0.3      ALP 69      ALT 9      AST 25      eGFR 23      Anion Gap 4.0      BUN/Creatinine Ratio 17     CBC WITH DIFFERENTIAL - Abnormal    WBC 7.67      RBC 3.65 (*)     Hgb 10.3 (*)     Hct 33.9 (*)     MCV 92.9      MCH 28.2      MCHC 30.4 (*)     RDW 13.6      Platelet 217      MPV 10.0      Neut % 56.0      Lymph % 34.6      Mono % 7.0      Eos % 1.6      Basophil % 0.5      Lymph # 2.65      Neut # 4.30      Mono # 0.54      Eos # 0.12      Baso # 0.04      IG# 0.02      IG% 0.3      NRBC% 0.0     LACTIC ACID, PLASMA - Normal    Lactic Acid Level 0.9     BLOOD CULTURE OLG   BLOOD CULTURE [facility]   CBC W/ AUTO DIFFERENTIAL    Narrative:     The following orders were created for panel order CBC auto differential.  Procedure                               Abnormality         Status                     ---------                               -----------         ------                     CBC with Differential[[id number]]       Abnormal            Final result                 Please view results for these tests on the individual orders.          Imaging Results              X-Ray Foot Complete Right (Final result)  Result time 12/10/24 12:43:56      Final result by Adrian Padilla MD (12/10/24 12:43:56)                   Impression:      Demineralization of the visualized osseous structures commensurate with patient's age.    Degenerative changes.      Electronically signed by: Adrian Padilla  Date:    12/10/2024  Time:    12:43               Narrative:    EXAMINATION:  XR FOOT COMPLETE 3 VIEW RIGHT    CLINICAL HISTORY:  , Pain in right lower leg.    FINDINGS:  There is demineralization of the visualized osseous structures commensurate with patient's age.    There are degenerative changes with narrowing of the proximal and distal interphalangeal joints articular spaces are otherwise preserved with smooth articular surfaces.    No acute fractures or dislocations identified                                        Medications   acetaminophen tablet 650 mg (has no administration in time range)   ondansetron injection 4 mg (has no administration in time range)   vancomycin (VANCOCIN) 1,000 mg in D5W 250 mL IVPB (admixture device) (1,000 mg Intravenous New Bag 12/10/24 1422)   ceFAZolin 2 g (2 g Intravenous Given 12/10/24 1357)     Medical Decision Making  Differential diagnosis includes but is not limited to fracture laceration abrasion contusion sprain cellulitis, abscess, foreign body    Patient is awake and alert.  Significantly demented.  Right foot is warmer, edematous, some mild erythema when compared to the left foot.  No obvious fracture.  No DVT.  No significant leukocytosis but evidently has failed outpatient treatment clindamycin.  Will start on antibiotics IV, admit for further evaluation and management.  Hospitalist, Jaja comfortable with the plan.  Care to be transferred.    Amount and/or Complexity of Data Reviewed  Independent Historian: EMS     Details: Per patient's daughter, the patient went to the [facility] ER on Sunday and was dx with cellulitis in her right foot. She states the patient was put on IV antibiotic treatment and was prescribed PO clindamycin. Mother reports the patient went back to the [facility] ER this morning and was given a clindamycin injection. Patient's mother states the antibiotics are not working and the patient is still in pain. She notes that the patient's right foot has progressively become more swollen and with worsening pain over the last 2 weeks.   External Data Reviewed: notes.     Details: [facility] chart review states patient was given a 300 mg clindamycin injection this morning and says the patient has been refusing to take her medications.  Labs: ordered. Decision-making details documented in ED Course.  Radiology: ordered and independent interpretation performed.     Details: Plain film foot negative for acute    Risk  OTC drugs.  Prescription drug management.  Decision  regarding hospitalization.            Scribe Attestation:   Scribe #1: I performed the above scribed service and the documentation accurately describes the services I performed. I attest to the accuracy of the note.    Attending Attestation:           Physician Attestation for Scribe:  Physician Attestation Statement for Scribe #1: I, Abel Mcdonald MD, reviewed documentation, as scribed by Asael Agee in my presence, and it is both accurate and complete.             ED Course as of 12/10/24 1455   Tue Dec 10, 2024   1130 CBC auto differential(!)  No leukocytosis.  Stable anemia [MM]   1145 Chart review reveals patient is seen earlier today at outside hospital given clindamycin injection.  Then seen by her PCP who evidently referred her here for failing outpatient treatment.  Was also seen 12/08 at outside hospital for cellulitis of foot started on clindamycin p.o. [MM]   1151 Comprehensive metabolic panel(!)  Elevated BUN and creatinine when compared to labs from 3 years ago.  Mild CHLOE.  No significant electrolyte abnormality [MM]      ED Course User Index  [MM] Abel Mcdonald MD                           Clinical Impression:  Final diagnoses:  [M79.604] Right leg pain  [M79.661, M79.89] Pain and swelling of right lower leg  [L03.115] Cellulitis of right foot (Primary)          ED Disposition Condition    Observation Stable                Abel Mcdonald MD  12/10/24 3698

## 2024-12-11 LAB
ALBUMIN SERPL-MCNC: 4.1 G/DL (ref 3.4–4.8)
ALBUMIN/GLOB SERPL: 1.6 RATIO (ref 1.1–2)
ALP SERPL-CCNC: 67 UNIT/L (ref 40–150)
ALT SERPL-CCNC: 9 UNIT/L (ref 0–55)
ANION GAP SERPL CALC-SCNC: 9 MEQ/L
AST SERPL-CCNC: 28 UNIT/L (ref 5–34)
BASOPHILS # BLD AUTO: 0.04 X10(3)/MCL
BASOPHILS NFR BLD AUTO: 0.5 %
BILIRUB SERPL-MCNC: 0.3 MG/DL
BUN SERPL-MCNC: 31.3 MG/DL (ref 9.8–20.1)
CALCIUM SERPL-MCNC: 8.6 MG/DL (ref 8.4–10.2)
CHLORIDE SERPL-SCNC: 106 MMOL/L (ref 98–111)
CO2 SERPL-SCNC: 22 MMOL/L (ref 23–31)
CREAT SERPL-MCNC: 1.97 MG/DL (ref 0.55–1.02)
CREAT/UREA NIT SERPL: 16
EOSINOPHIL # BLD AUTO: 0.17 X10(3)/MCL (ref 0–0.9)
EOSINOPHIL NFR BLD AUTO: 2 %
ERYTHROCYTE [DISTWIDTH] IN BLOOD BY AUTOMATED COUNT: 13.5 % (ref 11.5–17)
GFR SERPLBLD CREATININE-BSD FMLA CKD-EPI: 23 ML/MIN/1.73/M2
GLOBULIN SER-MCNC: 2.5 GM/DL (ref 2.4–3.5)
GLUCOSE SERPL-MCNC: 107 MG/DL (ref 75–121)
HCT VFR BLD AUTO: 33.1 % (ref 37–47)
HGB BLD-MCNC: 10.3 G/DL (ref 12–16)
IMM GRANULOCYTES # BLD AUTO: 0.02 X10(3)/MCL (ref 0–0.04)
IMM GRANULOCYTES NFR BLD AUTO: 0.2 %
LYMPHOCYTES # BLD AUTO: 2.43 X10(3)/MCL (ref 0.6–4.6)
LYMPHOCYTES NFR BLD AUTO: 28.1 %
MCH RBC QN AUTO: 28.1 PG (ref 27–31)
MCHC RBC AUTO-ENTMCNC: 31.1 G/DL (ref 33–36)
MCV RBC AUTO: 90.4 FL (ref 80–94)
MONOCYTES # BLD AUTO: 0.62 X10(3)/MCL (ref 0.1–1.3)
MONOCYTES NFR BLD AUTO: 7.2 %
NEUTROPHILS # BLD AUTO: 5.37 X10(3)/MCL (ref 2.1–9.2)
NEUTROPHILS NFR BLD AUTO: 62 %
NRBC BLD AUTO-RTO: 0 %
PLATELET # BLD AUTO: 228 X10(3)/MCL (ref 130–400)
PMV BLD AUTO: 10.3 FL (ref 7.4–10.4)
POTASSIUM SERPL-SCNC: 4.5 MMOL/L (ref 3.5–5.1)
PROT SERPL-MCNC: 6.6 GM/DL (ref 5.8–7.6)
RBC # BLD AUTO: 3.66 X10(6)/MCL (ref 4.2–5.4)
SODIUM SERPL-SCNC: 137 MMOL/L (ref 136–145)
VANCOMYCIN SERPL-MCNC: 12.1 UG/ML (ref 15–20)
WBC # BLD AUTO: 8.65 X10(3)/MCL (ref 4.5–11.5)

## 2024-12-11 PROCEDURE — 25000003 PHARM REV CODE 250: Performed by: STUDENT IN AN ORGANIZED HEALTH CARE EDUCATION/TRAINING PROGRAM

## 2024-12-11 PROCEDURE — 63600175 PHARM REV CODE 636 W HCPCS: Performed by: STUDENT IN AN ORGANIZED HEALTH CARE EDUCATION/TRAINING PROGRAM

## 2024-12-11 PROCEDURE — 80053 COMPREHEN METABOLIC PANEL: CPT | Performed by: NURSE PRACTITIONER

## 2024-12-11 PROCEDURE — 80202 ASSAY OF VANCOMYCIN: CPT | Performed by: STUDENT IN AN ORGANIZED HEALTH CARE EDUCATION/TRAINING PROGRAM

## 2024-12-11 PROCEDURE — 36415 COLL VENOUS BLD VENIPUNCTURE: CPT | Performed by: NURSE PRACTITIONER

## 2024-12-11 PROCEDURE — 85025 COMPLETE CBC W/AUTO DIFF WBC: CPT | Performed by: NURSE PRACTITIONER

## 2024-12-11 PROCEDURE — 11000001 HC ACUTE MED/SURG PRIVATE ROOM

## 2024-12-11 RX ORDER — CLINDAMYCIN PHOSPHATE 600 MG/50ML
600 INJECTION, SOLUTION INTRAVENOUS
Status: DISCONTINUED | OUTPATIENT
Start: 2024-12-11 | End: 2024-12-13

## 2024-12-11 RX ORDER — ENOXAPARIN SODIUM 100 MG/ML
30 INJECTION SUBCUTANEOUS EVERY 24 HOURS
Status: DISCONTINUED | OUTPATIENT
Start: 2024-12-11 | End: 2024-12-14 | Stop reason: HOSPADM

## 2024-12-11 RX ADMIN — DEXTROSE MONOHYDRATE 750 MG: 5 INJECTION, SOLUTION INTRAVENOUS at 09:12

## 2024-12-11 RX ADMIN — CLINDAMYCIN PHOSPHATE 600 MG: 600 INJECTION, SOLUTION INTRAVENOUS at 08:12

## 2024-12-11 RX ADMIN — CLINDAMYCIN PHOSPHATE 600 MG: 600 INJECTION, SOLUTION INTRAVENOUS at 12:12

## 2024-12-11 NOTE — H&P
[facility] Hospital Medicine History & Physical Examination       Patient Name: Loly Ramirez  MRN: [id number]  Patient Class: IP- Inpatient   Admission Date: 12/10/2024   Admitting Physician: MARIA E Service   Length of Stay: 0  Attending Physician: Jenny Govea DO  Primary Care Provider: Velma Rae MD  Face-to-Face encounter date: 12/11/2024  Code Status:  Full code  Chief Complaint: Cellulitis (Family member reporting cellulitis to pt's right foot x2 wks, started abx on Sunday without improvement told to come to ER by physician. Denies fever.)      Screening for Social Drivers for health:  Patient screened for food insecurity, housing instability, transportation needs, utility difficulties, and interpersonal safety (select all that apply as identified as concern)  []Housing or Food  []Transportation Needs  []Utility Difficulties  []Interpersonal safety  [x]None      Patient information was obtained from patient, patient's family, past medical records and ER records.  ED records were reviewed in detail and documented below    HISTORY OF PRESENT ILLNESS:   Loly Ramirez is a 89 y.o. female who  has a past medical history of HTN (hypertension).. The patient presented to [facility] on 12/10/2024 with a primary complaint of foot pain and swelling. Patient is a 89-year-old female with past medical history of hypertension, Alzheimer disease who presents with right foot pain and swelling worsening over the last 2 weeks.  Patient was seeing her PCP and was prescribed clindamycin however states it took it for 1 day and could no longer tolerate the pills and therefore presented to the hospital.  Patient's daughter providing most of the history.  States that patient is currently not on any medications outpatient and has been doing much better without it.  No prior history of cellulitis.  Labs and vitals were largely unremarkable with no leukocytosis.  Patient also remained afebrile.  Mild CHLOE noted  however no other significant electrolyte abnormalities.  Patient was admitted to us for cellulitis and further workup      PAST MEDICAL HISTORY:     Past Medical History:   Diagnosis Date    HTN (hypertension)        PAST SURGICAL HISTORY:     Past Surgical History:   Procedure Laterality Date    CHOLECYSTECTOMY      TUBAL LIGATION         ALLERGIES:   Penicillins    FAMILY HISTORY:   Reviewed and negative    SOCIAL HISTORY:     Social History     Tobacco Use    Smoking status: Never    Smokeless tobacco: Never   Substance Use Topics    Alcohol use: Never        HOME MEDICATIONS:     Prior to Admission medications    Medication Sig Start Date End Date Taking? Authorizing Provider   clindamycin (CLEOCIN) 150 MG capsule Take 150 mg by mouth 3 (three) times daily.   Yes Provider, Historical   sulfamethoxazole-trimethoprim 200-40 mg/5 ml (BACTRIM,SEPTRA) 200-40 mg/5 mL Susp Take 8 mg/kg/day by mouth every 12 (twelve) hours.   Yes Provider, Historical       REVIEW OF SYSTEMS:   Except as documented, all other systems reviewed and negative     PHYSICAL EXAM:     VITAL SIGNS: 24 HRS MIN & MAX LAST   Temp  Min: 97.4 °F (36.3 °C)  Max: 97.6 °F (36.4 °C) 97.5 °F (36.4 °C)   BP  Min: 96/53  Max: 135/69 129/60   Pulse  Min: 65  Max: 67  67   Resp  Min: 16  Max: 16 16   SpO2  Min: 95 %  Max: 100 % 97 %     General: In no acute distress, afebrile  Chest: Clear to auscultation bilaterally  Heart: RRR, +S1, S2, no appreciable murmur  Abdomen: Soft, nontender, BS +  MSK: Warm, warm right lower extremities with mild swelling  Neurologic: Alert and oriented x4, Cranial nerve II-XII intact, Strength 5/5 in all 4 extremities    LABS AND IMAGING:     Recent Labs   Lab 12/10/24  1055 12/11/24  0302   WBC 7.67 8.65   RBC 3.65* 3.66*   HGB 10.3* 10.3*   HCT 33.9* 33.1*   MCV 92.9 90.4   MCH 28.2 28.1   MCHC 30.4* 31.1*   RDW 13.6 13.5    228   MPV 10.0 10.3       Recent Labs   Lab 12/10/24  1055 12/11/24  0302    137   K 4.6  4.5    106   CO2 24 22*   BUN 34.2* 31.3*   CREATININE 1.96* 1.97*   CALCIUM 8.0* 8.6   ALBUMIN 3.9 4.1   ALKPHOS 69 67   ALT 9 9   AST 25 28   BILITOT 0.3 0.3       Microbiology Results (last 7 days)       Procedure Component Value Units Date/Time    Blood culture #1 **CANNOT BE ORDERED STAT** [[id number]]  (Normal) Collected: 12/10/24 1139    Order Status: Completed Specimen: Blood Updated: 12/11/24 1301     Blood Culture No Growth At 24 Hours    Blood culture #2 **CANNOT BE ORDERED STAT** [[id number]]  (Normal) Collected: 12/10/24 1139    Order Status: Completed Specimen: Blood Updated: 12/11/24 1301     Blood Culture No Growth At 24 Hours             CV Ultrasound doppler venous DVT leg right  Negative for deep and superficial vein thrombosis in the right lower   extremity.  X-Ray Foot Complete Right  Narrative: EXAMINATION:  XR FOOT COMPLETE 3 VIEW RIGHT    CLINICAL HISTORY:  , Pain in right lower leg.    FINDINGS:  There is demineralization of the visualized osseous structures commensurate with patient's age.    There are degenerative changes with narrowing of the proximal and distal interphalangeal joints articular spaces are otherwise preserved with smooth articular surfaces.    No acute fractures or dislocations identified  Impression: Demineralization of the visualized osseous structures commensurate with patient's age.    Degenerative changes.    Electronically signed by: Adrian Padilla  Date:    12/10/2024  Time:    12:43      ASSESSMENT & PLAN:   Right foot cellulitis  CKD stage 4   History of Alzheimer's disease, hypertension, hyper lipidemia     Start IV vancomycin for cellulitis  Right lower extremity ultrasound done, negative for any DVT   Home med rec completed, patient's daughter says that she is currently not on any home medications at this time even for hypertension or anything else   Blood cultures in process  Renal function stable close to baseline  Further recs based on clinical  course   Labs in a.m.           VTE Prophylaxis:  Lovenox    Patient condition:  Stable/Fair/Guarded/ Serious/ Critical    __________________________________________________________________________  INPATIENT LIST OF MEDICATIONS     Scheduled Meds:   clindamycin IV (PEDS and ADULTS)  600 mg Intravenous Q8H    enoxparin  30 mg Subcutaneous Q24H (prophylaxis, 1700)     Continuous Infusions:  PRN Meds:.  Current Facility-Administered Medications:     acetaminophen, 650 mg, Oral, Q6H PRN    ondansetron, 4 mg, Intravenous, Q4H PRN         All diagnosis and differential diagnosis have been reviewed; assessment and plan has been documented; I have personally reviewed the labs and test results that are presently available; I have reviewed the patients medication list; I have reviewed the consulting providers response and recommendations. I have reviewed or attempted to review medical records based upon their availability.    All of the patient and family questions have been addressed and answered. Patient's is agreeable to the above stated plan. I will continue to monitor closely and make adjustments to medical management as needed.    If patient was admitted under observational status it is with my approval/permission.      Jenny Govea DO  Department of Hospital Medicine  [facility]  12/11/2024

## 2024-12-11 NOTE — PROGRESS NOTES
Patient was a pending admit for [facility].  Prior to  admitting the patient, she presented back to hospital.  If attending orders  on this encounter, we need to verify with the patient that she still wishes to use [facility] for services.

## 2024-12-11 NOTE — NURSING
This morning, pt. BP showed 96/53, HR 68. SBP was 110 and above all day yesterday. Went to reassess. Daughter at bedside voiced that pt. had not slept and was up amb in the room all night. Pt. finally fell asleep this morning. Daughter requested not to wake the pt. up this morning. Daughter advised to have the pt. get up slowly next time she needed to get out of bed to avoid falls. Voiced understanding. Bed alarm turned on.

## 2024-12-11 NOTE — PROGRESS NOTES
[facility] Hospital Medicine Progress Note        Chief Complaint: Inpatient Follow-up for     HPI:   Patient is a 89-year-old female with past medical history of hypertension, Alzheimer disease who presents with right foot pain and swelling worsening over the last 2 weeks.  Patient was seeing her PCP and was prescribed clindamycin however states it took it for 1 day and could no longer tolerate the pills and therefore presented to the hospital.  Patient's daughter providing most of the history.  States that patient is currently not on any medications outpatient and has been doing much better without it.  No prior history of cellulitis.  Labs and vitals were largely unremarkable with no leukocytosis.  Patient also remained afebrile.  Mild CHLOE noted however no other significant electrolyte abnormalities.  Patient was admitted to us for cellulitis and further workup    Interval Hx:   Patient seen and examined by bedside.  Daughter by bedside.  Slept for only half the night.  Per daughter this is her normal and she does not usually sleep all night and wanders through the night.  She did eat all of her breakfast.  Swelling is slowly improving    Case was discussed with patient's nurse and  on the floor.    Objective/physical exam:  General: In no acute distress, afebrile  Chest: Clear to auscultation bilaterally  Heart: RRR, +S1, S2, no appreciable murmur  Abdomen: Soft, nontender, BS +  MSK: Warm, warm right lower extremities with mild swelling  Neurologic: Alert and oriented x4, Cranial nerve II-XII intact, Strength 5/5 in all 4 extremities    VITAL SIGNS: 24 HRS MIN & MAX LAST   Temp  Min: 97.4 °F (36.3 °C)  Max: 97.6 °F (36.4 °C) 97.5 °F (36.4 °C)   BP  Min: 96/53  Max: 151/91 129/60   Pulse  Min: 50  Max: 67  67   Resp  Min: 16  Max: 16 16   SpO2  Min: 95 %  Max: 100 % 97 %     I have reviewed the following labs:  Recent Labs   Lab 12/10/24  1055 12/11/24  0302   WBC 7.67  8.65   RBC 3.65* 3.66*   HGB 10.3* 10.3*   HCT 33.9* 33.1*   MCV 92.9 90.4   MCH 28.2 28.1   MCHC 30.4* 31.1*   RDW 13.6 13.5    228   MPV 10.0 10.3     Recent Labs   Lab 12/10/24  1055 12/11/24  0302    137   K 4.6 4.5    106   CO2 24 22*   BUN 34.2* 31.3*   CREATININE 1.96* 1.97*   CALCIUM 8.0* 8.6   ALBUMIN 3.9 4.1   ALKPHOS 69 67   ALT 9 9   AST 25 28   BILITOT 0.3 0.3     Microbiology Results (last 7 days)       Procedure Component Value Units Date/Time    Blood culture #1 **CANNOT BE ORDERED STAT** [[id number]]  (Normal) Collected: 12/10/24 1139    Order Status: Completed Specimen: Blood Updated: 12/11/24 1301     Blood Culture No Growth At 24 Hours    Blood culture #2 **CANNOT BE ORDERED STAT** [[id number]]  (Normal) Collected: 12/10/24 1139    Order Status: Completed Specimen: Blood Updated: 12/11/24 1301     Blood Culture No Growth At 24 Hours             See below for Radiology    Assessment/Plan:  Right foot cellulitis  CKD stage 4   History of Alzheimer's disease, hypertension, hyper lipidemia    Continue IV clindamycin  Right lower extremity ultrasound done, negative for any DVT   Home med rec completed, patient's daughter says that she is currently not on any home medications at this time even for hypertension or anything else   Consult PT and OT  Blood cultures normal to date  Renal function stable   Further recs based on clinical course   Labs in a.m.    VTE prophylaxis:  Lovenox    Patient condition:  fair    Anticipated discharge and Disposition:   Likely return back home with daughter      All diagnosis and differential diagnosis have been reviewed; assessment and plan has been documented; I have personally reviewed the labs and test results that are presently available; I have reviewed the patients medication list; I have reviewed the consulting providers response and recommendations. I have reviewed or attempted to review medical records based upon their availability    All of  the patient's questions have been  addressed and answered. Patient's is agreeable to the above stated plan. I will continue to monitor closely and make adjustments to medical management as needed.    Portions of this note dictated using EMR integrated voice recognition software, and may be subject to voice recognition errors not corrected at proofreading. Please contact writer for clarification if needed.   _____________________________________________________________________    Malnutrition Status:  Nutrition consulted. Most recent weight and BMI monitored-     Measurements:  Wt Readings from Last 1 Encounters:   12/10/24 54.4 kg (120 lb)   Body mass index is 23.44 kg/m².    Patient has been screened and assessed by RD.    Malnutrition Type:  Context:    Level:      Malnutrition Characteristic Summary:       Interventions/Recommendations (treatment strategy):        Scheduled Med:   clindamycin IV (PEDS and ADULTS)  600 mg Intravenous Q8H      Continuous Infusions:     PRN Meds:    Current Facility-Administered Medications:     acetaminophen, 650 mg, Oral, Q6H PRN    ondansetron, 4 mg, Intravenous, Q4H PRN     Radiology:  I have personally reviewed the following imaging and agree with the radiologist.     CV Ultrasound doppler venous DVT leg right  Negative for deep and superficial vein thrombosis in the right lower   extremity.  X-Ray Foot Complete Right  Narrative: EXAMINATION:  XR FOOT COMPLETE 3 VIEW RIGHT    CLINICAL HISTORY:  , Pain in right lower leg.    FINDINGS:  There is demineralization of the visualized osseous structures commensurate with patient's age.    There are degenerative changes with narrowing of the proximal and distal interphalangeal joints articular spaces are otherwise preserved with smooth articular surfaces.    No acute fractures or dislocations identified  Impression: Demineralization of the visualized osseous structures commensurate with patient's age.    Degenerative  changes.    Electronically signed by: Adrian Padilla  Date:    12/10/2024  Time:    12:43      Jenny Govea DO  Department of Hospital Medicine  [place]  12/11/2024

## 2024-12-11 NOTE — PROGRESS NOTES
Pharmacokinetic Assessment Follow Up: IV Vancomycin    Vancomycin serum concentration assessment(s):    The random level was drawn correctly and can be used to guide therapy at this time. The measurement is within the desired definitive target range of 10 to 20 mcg/mL.    Vancomycin Regimen Plan:    Re-dose with vancomycin 750mg x1.  Re-dose when the random level is less than 20 mcg/mL, next level to be drawn at 0430 on 12/12.    Drug levels (last 3 results):  Recent Labs   Lab Result Units 12/11/24  0302   Vancomycin Random ug/ml 12.1*       Pharmacy will continue to follow and monitor vancomycin.    Please contact pharmacy at extension [phone] for questions regarding this assessment.    Thank you for the consult,   Dolores Virgen       Patient brief summary:  Loly Ramirez is a 89 y.o. female initiated on antimicrobial therapy with IV Vancomycin for treatment of skin & soft tissue infection    The patient's current regimen is pulse dose.    Drug Allergies:   Review of patient's allergies indicates:   Allergen Reactions    Penicillins        Actual Body Weight:   54.4kg    Renal Function:   Estimated Creatinine Clearance: 12.3 mL/min (A) (based on SCr of 1.97 mg/dL (H)).,     Dialysis Method (if applicable):  N/A    CBC (last 72 hours):  Recent Labs   Lab Result Units 12/10/24  1055 12/11/24  0302   WBC x10(3)/mcL 7.67 8.65   Hgb g/dL 10.3* 10.3*   Hct % 33.9* 33.1*   Platelet x10(3)/mcL 217 228   Mono % % 7.0 7.2   Eos % % 1.6 2.0   Basophil % % 0.5 0.5       Metabolic Panel (last 72 hours):  Recent Labs   Lab Result Units 12/10/24  1055 12/11/24  0302   Sodium mmol/L 138 137   Potassium mmol/L 4.6 4.5   Chloride mmol/L 110 106   CO2 mmol/L 24 22*   Glucose mg/dL 78 107   Blood Urea Nitrogen mg/dL 34.2* 31.3*   Creatinine mg/dL 1.96* 1.97*   Albumin g/dL 3.9 4.1   Bilirubin Total mg/dL 0.3 0.3   ALP unit/L 69 67   AST unit/L 25 28   ALT unit/L 9 9       Vancomycin Administrations:  vancomycin given in the last 96 hours                      vancomycin (VANCOCIN) 1,000 mg in D5W 250 mL IVPB (admixture device) (mg) 1,000 mg New Bag 12/10/24 1422                    Microbiologic Results:  Microbiology Results (last 7 days)       Procedure Component Value Units Date/Time    Blood culture #1 **CANNOT BE ORDERED STAT** [[id number]] Collected: 12/10/24 1139    Order Status: Resulted Specimen: Blood Updated: 12/10/24 1230    Blood culture #2 **CANNOT BE ORDERED STAT** [[id number]] Collected: 12/10/24 1139    Order Status: Resulted Specimen: Blood Updated: 12/10/24 1204

## 2024-12-12 PROCEDURE — 11000001 HC ACUTE MED/SURG PRIVATE ROOM

## 2024-12-12 PROCEDURE — 25000003 PHARM REV CODE 250: Performed by: HOSPITALIST

## 2024-12-12 PROCEDURE — 97162 PT EVAL MOD COMPLEX 30 MIN: CPT

## 2024-12-12 PROCEDURE — 97166 OT EVAL MOD COMPLEX 45 MIN: CPT

## 2024-12-12 PROCEDURE — 63600175 PHARM REV CODE 636 W HCPCS: Mod: JZ,JG | Performed by: STUDENT IN AN ORGANIZED HEALTH CARE EDUCATION/TRAINING PROGRAM

## 2024-12-12 PROCEDURE — 99223 1ST HOSP IP/OBS HIGH 75: CPT | Mod: ,,, | Performed by: HOSPITALIST

## 2024-12-12 RX ORDER — CLOTRIMAZOLE AND BETAMETHASONE DIPROPIONATE 10; .64 MG/G; MG/G
CREAM TOPICAL NIGHTLY
Status: DISCONTINUED | OUTPATIENT
Start: 2024-12-12 | End: 2024-12-14 | Stop reason: HOSPADM

## 2024-12-12 RX ORDER — CEPHALEXIN 250 MG/5ML
500 POWDER, FOR SUSPENSION ORAL EVERY 12 HOURS
Status: DISCONTINUED | OUTPATIENT
Start: 2024-12-12 | End: 2024-12-14 | Stop reason: HOSPADM

## 2024-12-12 RX ADMIN — CEPHALEXIN 500 MG: 250 FOR SUSPENSION ORAL at 08:12

## 2024-12-12 RX ADMIN — CLINDAMYCIN PHOSPHATE 600 MG: 600 INJECTION, SOLUTION INTRAVENOUS at 11:12

## 2024-12-12 RX ADMIN — CLINDAMYCIN PHOSPHATE 600 MG: 600 INJECTION, SOLUTION INTRAVENOUS at 04:12

## 2024-12-12 RX ADMIN — CLOTRIMAZOLE AND BETAMETHASONE DIPROPIONATE: 10; .5 CREAM TOPICAL at 09:12

## 2024-12-12 RX ADMIN — CLINDAMYCIN PHOSPHATE 600 MG: 600 INJECTION, SOLUTION INTRAVENOUS at 08:12

## 2024-12-12 NOTE — PLAN OF CARE
Problem: Physical Therapy  Goal: Physical Therapy Goal  Description: Goals to be met by: d/c     Patient will increase functional independence with mobility by performin. Supine to sit with Stand-by Assistance  2. Sit to supine with Stand-by Assistance  3. Sit to stand transfer with Stand-by Assistance  4. Bed to chair transfer with Stand-by Assistance using Least Restrictive Assistive Device  5. Gait  x 100 feet with Stand-by Assistance using Least Restrictive Assistive Device.     Outcome: Progressing

## 2024-12-12 NOTE — PLAN OF CARE
12/12/24 1246   Discharge Assessment   Assessment Type Discharge Planning Assessment   Confirmed/corrected address, phone number and insurance Yes   Confirmed Demographics Correct on Facesheet   Source of Information family   Communicated ELIZABETH with patient/caregiver Date not available/Unable to determine   Reason For Admission Right leg pain   People in Home sibling(s)   Do you expect to return to your current living situation? Yes   Do you have help at home or someone to help you manage your care at home? Yes   Who are your caregiver(s) and their phone number(s)? Jessa Ramirez [phone]   Prior to hospitilization cognitive status: Unable to Assess   Current cognitive status: Not Oriented to Time   Walking or Climbing Stairs Difficulty yes   Walking or Climbing Stairs ambulation difficulty, requires equipment   Mobility Management rw,   Dressing/Bathing Difficulty yes   Dressing/Bathing bathing difficulty, assistance 1 person;dressing difficulty, assistance 1 person   Dressing/Bathing Management Jessa assist   Equipment Currently Used at Home wheelchair;walker, rolling;bedside commode   Do you currently have service(s) that help you manage your care at home? No  ([facility] was unable to admit patient due to return to hospital)   Do you have prescription coverage? Yes   Coverage Medicare   Who is going to help you get home at discharge? Jessa   How do you get to doctors appointments? family or friend will provide   Are you on dialysis? No   Do you take coumadin? No   Discharge Plan A Home Health   Discharge Plan B Home Health   DME Needed Upon Discharge  other (see comments)  (TBD)   Discharge Plan discussed with: Sibling;Patient   Transition of Care Barriers None   OTHER   Name(s) of People in Home Jessa Ramirez     Patient referral sent to [facility] prior to admit. Jessa Ramirez no longer wants [facility] and would like home health services through [facility]. PT/OT consulted, CM will follow directions.      PCP Velma Rae MD

## 2024-12-12 NOTE — PT/OT/SLP EVAL
Occupational Therapy   Evaluation and Discharge Note    Name: Loly Ramirez  MRN: [id number]    Recommendations:     Discharge therapy intensity: No Therapy Indicated   Discharge Equipment Recommendations: none  Barriers to discharge:  None    Assessment:     Loly Ramirez is a 89 y.o. female with a medical diagnosis of R foot cellulitis. Hx of Alzheimer's. On eval, patient presents with max-total A for ADLs and min A for functional mobility using RW. Daughter reports that patient requires max-total A at baseline for all ADLs, is able to ambulate household distances with no AD and uses a wheelchair for community mobility. Patient's daughter is the primary caregiver due to patient's cognitive deficits impeding on her independence. Skilled OT services are not warranted at this time.    Plan:     OT to sign off as acute OT services are not warranted at this time.  Please re-consult if situation changes during this hospitalization.    Plan of Care Reviewed with: patient    Subjective     Chief Complaint: none stated  Patient/Family Comments/goals: none stated    Occupational Profile:  Living Environment: lives with daughter in a [facility], no CAYLA; tub/shower  Previous level of function: max-total A for all ADLs  Roles and Routines: mother  Equipment Used at Home: bath bench (has w/c, RW)  Assistance upon Discharge: daughter    Pain/Comfort:  Pain Rating 1: 0/10    Patients cultural, spiritual, Voodoo conflicts given the current situation: no    Objective:     OT communicated with NSG prior to session.      Patient was found HOB elevated with peripheral IV upon OT entry to room.    General Precautions: Standard, fall  Orthopedic Precautions: N/A  Braces: N/A    Vital Signs: Respiratory Status: on room air    Bed Mobility:    Patient completed Supine to Sit with moderate assistance    Functional Mobility/Transfers:  Patient completed Sit <> Stand Transfer with minimum assistance  with  rolling walker   Patient completed Bed <>  Chair Transfer using Step Transfer technique with minimum assistance with rolling walker  Functional Mobility: ambulating with min A using RW; constant cues required    Activities of Daily Living:  Grooming: maximal assistance    Upper Body Dressing: maximal assistance    Lower Body Dressing: total assistance      [facility] 6 Click ADL:  [facility] Total Score: 12    Functional Cognition:  Not oriented to place nor name; impaired STM and poor immediate recall; inability to accurately perform commands  Daughter reports this is baseline    Visual Perceptual Skills:  Able to locate appropriately in room    Upper Extremity Function:  BUE:   Appears WFL      Therapeutic Positioning  Risk for acquired pressure injuries is increased due to relative decrease in mobility d/t hospitalization  and impaired mobility.    OT interventions performed during the course of today's session in an effort to prevent and/or reduce acquired pressure injuries:   Education was provided on benefits of and recommendations for therapeutic positioning    Skin assessment: all bony prominences were assessed    Findings: no redness or breakdown noted    OT recommendations for therapeutic positioning throughout hospitalization:   Follow [facility] Pressure Injury Prevention Protocol  Geomat recommended for sacral protection while [facility]  [employer] - watch for needs      Patient Education:  Patient and daughter/s were provided with verbal education education regarding OT role/goals/POC, fall prevention, safety awareness, Discharge/DME recommendations, and pressure ulcer prevention.  Additional teaching is warranted.     Patient left up in chair with all lines intact, call button in reach, NSG notified, and daughter present.    History:     Past Medical History:   Diagnosis Date    HTN (hypertension)          Past Surgical History:   Procedure Laterality Date    CHOLECYSTECTOMY      TUBAL LIGATION         Time Tracking:     OT Date of Treatment:    OT Start  Time: 0830  OT Stop Time: 0843  OT Total Time (min): 13 min    Billable Minutes:Evaluation mod    12/12/2024

## 2024-12-12 NOTE — PT/OT/SLP EVAL
Physical Therapy Evaluation    Patient Name:  Loly Ramirez   MRN:  [id number]    Recommendations:     Discharge therapy intensity: Low Intensity Therapy   Discharge Equipment Recommendations: none   Barriers to discharge: Impaired mobility and Ongoing medical needs    Assessment:     Loly Ramirez is a 89 y.o. female admitted with a medical diagnosis of (R) foot cellulitis. PMH: Alzheimer's.  She presents with the following impairments/functional limitations: weakness, impaired endurance, decreased lower extremity function, gait instability, impaired functional mobility, impaired balance. Pt tolerated session fair, limited 2/2 cognitive deficits. Per daughter, normally ambulates I around the house; however, requires assistance for all ADL's and safety. Pt very difficult to redirect to task at hand, difficult to initiate gait training but improved with distance. Daughter present stating she can assist 24/7, recommending low intensity with 24/7 support.     Rehab Prognosis: Fair; patient would benefit from acute skilled PT services to address these deficits and reach maximum level of function.    Recent Surgery: * No surgery found *      Plan:     During this hospitalization, patient would benefit from acute PT services 3 x/week to address the identified rehab impairments via gait training, therapeutic activities, therapeutic exercises and progress toward the following goals:    Plan of Care Expires:  01/18/25    Subjective     Chief Complaint: none noted  Patient/Family Comments/goals: to return to PLOF  Pain/Comfort:  Pain Rating 1: 0/10    Patients cultural, spiritual, Anglican conflicts given the current situation:      Living Environment:  Pt lives with daughter in a [facility] with threshold step entrance. 24/7 care from daughter.   Prior to admission, patients level of function was I household, w/c for community.  Equipment used at home: wheelchair, bedside commode.  DME owned (not currently used): rolling walker.   Upon discharge, patient will have assistance from daughter.    Objective:     Communicated with RN prior to session.  Patient found HOB elevated with peripheral IV  upon PT entry to room.    General Precautions: Standard, fall  Orthopedic Precautions:N/A   Braces: N/A  Respiratory Status: Room air      Exams:  RLE Strength: unable to accurately assess 2/2 cognition  LLE Strength: unable to accurately assess 2/2 cognition  Skin integrity:  redness to R foot      Functional Mobility:  Bed Mobility:     Supine to Sit: maximal assistance 2/2 poor motor planning and initiation of task  Transfers:     Sit to Stand:  moderate assistance  2/2 poor motor planning and initiation of task   Bed to Chair: minimum assistance with  no AD  using  Stand Pivot  Gait: Pt ambulated x 60' with HHA Tracie, required significant redirection to task at hand and cuing to continue ambulating.       AM-PAC 6 CLICK MOBILITY  Total Score:13       Treatment & Education:    Patient and daughter/s were provided with verbal education education regarding PT role/goals/POC, fall prevention, and safety awareness.  Understanding was verbalized, however additional teaching warranted.     Patient left up in chair with all lines intact, call button in reach, and family present.    GOALS:   Multidisciplinary Problems       Physical Therapy Goals          Problem: Physical Therapy    Goal Priority Disciplines Outcome Interventions   Physical Therapy Goal     PT, PT/OT Progressing    Description: Goals to be met by: d/c     Patient will increase functional independence with mobility by performin. Supine to sit with Stand-by Assistance  2. Sit to supine with Stand-by Assistance  3. Sit to stand transfer with Stand-by Assistance  4. Bed to chair transfer with Stand-by Assistance using Least Restrictive Assistive Device  5. Gait  x 100 feet with Stand-by Assistance using Least Restrictive Assistive Device.                          History:     Past Medical  History:   Diagnosis Date    HTN (hypertension)        Past Surgical History:   Procedure Laterality Date    CHOLECYSTECTOMY      TUBAL LIGATION         Time Tracking:     PT Received On: 12/12/24  PT Start Time: 0830     PT Stop Time: 0843  PT Total Time (min): 13 min     Billable Minutes: Evaluation 13      12/12/2024

## 2024-12-12 NOTE — CONSULTS
Infectious Disease  Patient Name Loly Ramirez  89 y.o. female.  MRN: [id number]   Length of stay: 1  Chief Complaint: Cellulitis (Family member reporting cellulitis to pt's right foot x2 wks, started abx on Sunday without improvement told to come to ER by physician. Denies fever.)    Interval history:   12/12 afebrile. SSTI is improving, there is minimal erythema today with some lateral malleolus swelling and tenderenss. She is ambulating without difficulty. SSTI of  right foot, intolerance to oral abx outpatient, admitted for IV therapy. Daughter concerned because mother will not cooperate with oral abx due to dementia. During exam patient was also quite uncooperative. Will trial cephalexin liquid, if unsuccessful will continue IV administration. Limited options due to allergy.   Assessment and Plan:   1)  SSTI  - right foot with minimal erythema, there is ankle swelling  - BCx 12/10 - ngtd  - PCN allergy   - s/p cefazolin, vancomycin   - currently on clindamycin IV  - star oral cephalexin    2) Dementia  - at risk for falls  - impediment to care    3) PCN allergy   - unspecified reaction  - tolerated cephalosporin in ER  - will trial    Discussed with patient and daughter at bedside 12/12   Discussed and seen with RN    Kristy Albrecht MD, MPH  [facility]    Thank you for this consultation. I will follow up with the patient. Please contact via Epic secure chat with any questions.       HPI:   Patient is Loly nesbitt 89 y.o. female admitted on 12/10 with complaint of pain, redness and swelling for 2 weeks. Patient sough medical attention for the same with PCP who prescribed clindamycin but she could not tolerate and was therefore sent to the hospital. Upon evaluation patient with SSTI of foot. She is on empiric coverage.       Patient has known hypertension,Alzheimer's s/p tubal ligation and cholecystectomy.   Infectious diseases consulted for evaluation and management.     Past  Medical History:   Diagnosis Date    HTN (hypertension)      Past Surgical History:   Procedure Laterality Date    CHOLECYSTECTOMY      TUBAL LIGATION       Review of patient's allergies indicates:   Allergen Reactions    Penicillins      Current Outpatient Medications   Medication Instructions    clindamycin (CLEOCIN) 150 mg, 3 times daily    sulfamethoxazole-trimethoprim 200-40 mg/5 ml (BACTRIM,SEPTRA) 200-40 mg/5 mL Susp 8 mg/kg/day, Every 12 hours       Current Facility-Administered Medications:     acetaminophen tablet 650 mg, 650 mg, Oral, Q6H PRN, Jaja Hopkins AGACNP-BC    clindamycin in D5W 600 mg/50 mL IVPB 600 mg, 600 mg, Intravenous, Q8H, Bux, Jenny, DO, Stopped at 12/12/24 1210    enoxaparin injection 30 mg, 30 mg, Subcutaneous, Q24H (prophylaxis, 1700), Bux, Jenny, DO    ondansetron injection 4 mg, 4 mg, Intravenous, Q4H PRN, Jaja Hopkins AGACNP-BC  Review of Systems   Constitutional:  Negative for chills and fever.   HENT:  Negative for congestion, ear discharge, ear pain, facial swelling, mouth sores, postnasal drip, rhinorrhea, sinus pressure, sinus pain, sneezing, sore throat and trouble swallowing.    Eyes:  Negative for discharge, redness and itching.   Respiratory:  Negative for cough, chest tightness, shortness of breath and wheezing.    Cardiovascular:  Negative for chest pain, palpitations and leg swelling.   Gastrointestinal:  Negative for abdominal distention, abdominal pain, diarrhea, nausea and vomiting.   Genitourinary:  Negative for dysuria, flank pain, frequency and urgency.   Musculoskeletal:  Negative for back pain, myalgias and neck stiffness.   Skin:  Positive for color change and rash. Negative for wound.   Allergic/Immunologic: Negative for immunocompromised state.   Neurological:  Negative for dizziness, light-headedness and headaches.   Hematological:  Negative for adenopathy.   Psychiatric/Behavioral:  Negative for agitation, confusion and suicidal ideas. The  patient is not nervous/anxious.        Objective:   Temp:  [97.1 °F (36.2 °C)-97.5 °F (36.4 °C)] 97.1 °F (36.2 °C)  Pulse:  [61-93] 93  Resp:  [18] 18  SpO2:  [98 %-100 %] 100 %  BP: ()/(46-76) 146/74     Physical Exam  Constitutional:       Appearance: Normal appearance. She is well-developed.   HENT:      Head: Normocephalic.      Nose: Nose normal.      Mouth/Throat:      Pharynx: No oropharyngeal exudate.   Eyes:      General: Lids are normal. No scleral icterus.        Right eye: No discharge.      Conjunctiva/sclera: Conjunctivae normal.      Pupils: Pupils are equal, round, and reactive to light.   Neck:      Thyroid: No thyromegaly.      Vascular: No JVD.      Trachea: Trachea normal.   Cardiovascular:      Rate and Rhythm: Normal rate and regular rhythm.      Pulses: Normal pulses.      Heart sounds: Normal heart sounds. No murmur heard.     No friction rub.   Pulmonary:      Effort: Pulmonary effort is normal. No respiratory distress.      Breath sounds: Normal breath sounds. No wheezing.   Chest:      Chest wall: No tenderness.   Abdominal:      General: Bowel sounds are normal. There is no distension.      Palpations: Abdomen is soft.      Tenderness: There is no abdominal tenderness. There is no guarding or rebound.   Musculoskeletal:         General: No tenderness. Normal range of motion.      Cervical back: Full passive range of motion without pain, normal range of motion and neck supple.   Lymphadenopathy:      Cervical: No cervical adenopathy.   Skin:     General: Skin is warm and dry.      Findings: Erythema present. No rash.   Neurological:      Mental Status: She is alert and oriented to person, place, and time.      Cranial Nerves: No cranial nerve deficit.      Sensory: No sensory deficit.   Psychiatric:         Speech: Speech normal.         Behavior: Behavior normal.         Thought Content: Thought content normal.         Judgment: Judgment normal.         Estimated Creatinine  Clearance: 12.3 mL/min (A) (based on SCr of 1.97 mg/dL (H)).  Recent Labs   Lab 12/11/24  0302   WBC 8.65        Microbiology Results (last 7 days)       Procedure Component Value Units Date/Time    Blood culture #1 **CANNOT BE ORDERED STAT** [[id number]]  (Normal) Collected: 12/10/24 1139    Order Status: Completed Specimen: Blood Updated: 12/12/24 1300     Blood Culture No Growth At 48 Hours    Blood culture #2 **CANNOT BE ORDERED STAT** [[id number]]  (Normal) Collected: 12/10/24 1139    Order Status: Completed Specimen: Blood Updated: 12/12/24 1300     Blood Culture No Growth At 48 Hours            Significant Labs: All pertinent labs within the past 24 hours have been reviewed.    Significant Imaging: I have reviewed all relevant and available imaging results/findings within the past 24 hours.      Plan -- see top of note

## 2024-12-12 NOTE — PROGRESS NOTES
[facility] Hospital Medicine Progress Note        Chief Complaint: Inpatient Follow-up for      HPI:   Patient is a 89-year-old female with past medical history of hypertension, Alzheimer disease who presents with right foot pain and swelling worsening over the last 2 weeks.  Patient was seeing her PCP and was prescribed clindamycin however states it took it for 1 day and could no longer tolerate the pills and therefore presented to the hospital.  Patient's daughter providing most of the history.  States that patient is currently not on any medications outpatient and has been doing much better without it.  No prior history of cellulitis.  Labs and vitals were largely unremarkable with no leukocytosis.  Patient also remained afebrile.  Mild CHLOE noted however no other significant electrolyte abnormalities.  Patient was admitted to us for cellulitis and further workup       Patient currently being managed for right foot cellulitis currently on IV clindamycin.    Today's information   Patient seen and examined at bedside, daughter at bedside   Daughter is worried that the infection is not improving and we would like to have infectious disease consulted   Patient reports she has no pain, no fever no nausea no vomiting no diarrhea   Vitals reviewed and stable on room air   Creatinine of 1.97, stable   Right lower extremity was negative for DVT       Case was discussed with patient's nurse and  on the floor.     Objective/physical exam:  General: In no acute distress, afebrile  Chest: Clear to auscultation bilaterally  Heart: RRR, +S1, S2, no appreciable murmur  Abdomen: Soft, nontender, BS +  MSK: Warm, warm right lower extremities with mild swelling  Neurologic: Alert and oriented x4, Cranial nerve II-XII intact, Strength 5/5 in all 4 extremities      Assessment/Plan:  Right foot cellulitis  CKD stage 4   History of Alzheimer's disease, hypertension, hyper lipidemia     Plan  I  have explained to patient It will take some time for all the infection to clear  - infection was going on for 10-14 days prior to coming to the hospital and patient is elderly   Consult ID to help provide some reassurance stool her daughter   Continue IV clindamycin  Right lower extremity ultrasound done, negative for any DVT   Home med rec completed, patient's daughter says that she is currently not on any home medications at this time even for hypertension or anything else   Consult PT and OT  Blood cultures normal to date  Renal function stable   Further recs based on clinical course     VTE prophylaxis:  Lovenox     Patient condition:  fair     Anticipated discharge and Disposition:   Likely return back home with daughter        All diagnosis and differential diagnosis have been reviewed; assessment and plan has been documented; I have personally reviewed the labs and test results that are presently available; I have reviewed the patients medication list; I have reviewed the consulting providers response and recommendations. I have reviewed or attempted to review medical records based upon their availability     All of the patient's questions have been  addressed and answered. Patient's is agreeable to the above stated plan. I will continue to monitor closely and make adjustments to medical management as needed.     Portions of this note dictated using EMR integrated voice recognition software, and may be subject to voice recognition errors not corrected at proofreading. Please contact writer for clarification if needed.   ___________________________________    All diagnosis and differential diagnosis have been reviewed; assessment and plan has been documented; I have personally reviewed the labs and test results that are presently available; I have reviewed the patients medication list; I have reviewed the consulting providers response and recommendations. I have reviewed or attempted to review medical records  based upon their availability    All of the patient's questions have been  addressed and answered. Patient's is agreeable to the above stated plan. I will continue to monitor closely and make adjustments to medical management as needed.    Portions of this note dictated using EMR integrated voice recognition software, and may be subject to voice recognition errors not corrected at proofreading. Please contact writer for clarification if needed.     VITAL SIGNS: 24 HRS MIN & MAX LAST   Temp  Min: 97.7 °F (36.5 °C)  Max: 98.4 °F (36.9 °C) 98.4 °F (36.9 °C)   BP  Min: 102/63  Max: 116/68 110/71   Pulse  Min: 56  Max: 69  69   Resp  Min: 18  Max: 18 18   SpO2  Min: 93 %  Max: 97 % (!) 93 %     I have reviewed the following labs:  Recent Labs   Lab 08/12/24 0441 08/13/24  0433 08/14/24  0500   WBC 7.19 5.66 5.53   RBC 4.95 4.46* 4.39*   HGB 14.8 13.6* 13.4*   HCT 43.5 39.0* 38.4*   MCV 87.9 87.4 87.5   MCH 29.9 30.5 30.5   MCHC 34.0 34.9 34.9   RDW 12.8 12.7 12.7    206 200   MPV 9.0 9.0 9.4     Recent Labs   Lab 08/12/24 0441 08/13/24  0433 08/14/24  0500 08/15/24  0508   * 133* 132* 135*   K 3.5 3.4* 3.3* 4.1   CL 97* 101 98 101   CO2 24 22* 23 22*   BUN 20.0 19.6 16.7 15.4   CREATININE 1.05 1.00 0.99 0.92   CALCIUM 9.3 9.0 8.9 9.2   MG 2.60 2.10 1.80 2.00   ALBUMIN 3.6 3.4 3.3*  --    ALKPHOS 112 103 97  --    ALT 22 19 20  --    AST 24 20 25  --    BILITOT 0.8 0.7 0.7  --      Microbiology Results (last 7 days)       ** No results found for the last 168 hours. **          _____________________________________________________________________    Malnutrition Status:    Scheduled Med:   apixaban  5 mg Oral BID    aspirin  81 mg Oral Daily    atorvastatin  40 mg Oral QHS    diltiaZEM  360 mg Oral Daily    docusate sodium  50 mg Oral BID    ergocalciferol  50,000 Units Oral Q7 Days    guaiFENesin  600 mg Oral BID    insulin glargine U-100  15 Units Subcutaneous QHS    QUEtiapine  150 mg Oral Nightly       Continuous Infusions:     PRN Meds:    Current Facility-Administered Medications:     acetaminophen, 1,000 mg, Oral, Q6H PRN    aluminum-magnesium hydroxide-simethicone, 30 mL, Oral, QID PRN    bisacodyL, 10 mg, Rectal, Daily PRN    dextrose 10%, 12.5 g, Intravenous, PRN    dextrose 10%, 25 g, Intravenous, PRN    glucagon (human recombinant), 1 mg, Intramuscular, PRN    glucose, 16 g, Oral, PRN    glucose, 24 g, Oral, PRN    guaiFENesin 100 mg/5 ml, 200 mg, Oral, Q4H PRN    HYDROcodone-acetaminophen, 1 tablet, Oral, Q4H PRN    insulin aspart U-100, 1-10 Units, Subcutaneous, QID (AC + HS) PRN    melatonin, 6 mg, Oral, Nightly PRN    naloxone, 0.02 mg, Intravenous, PRN    ondansetron, 4 mg, Intravenous, Q4H PRN    prochlorperazine, 5 mg, Intravenous, Q6H PRN    senna-docusate 8.6-50 mg, 1 tablet, Oral, BID PRN    sodium chloride 0.9%, 10 mL, Intravenous, PRN     Radiology:  I have personally reviewed the following imaging and agree with the radiologist.     Cardiac catheterization  Procedure performed in the Invasive Lab    - See Procedure Log link below for nursing documentation    - See OpNote on Surgeries Tab for physician findings    - See Imaging Tab for radiologist dictation      Megan Barraza MD  Department of Hospital Medicine   [facility]   08/18/2024

## 2024-12-13 LAB — MRSA PCR SCRN (OHS): NOT DETECTED

## 2024-12-13 PROCEDURE — 11000001 HC ACUTE MED/SURG PRIVATE ROOM

## 2024-12-13 PROCEDURE — 25000003 PHARM REV CODE 250: Performed by: HOSPITALIST

## 2024-12-13 PROCEDURE — 87641 MR-STAPH DNA AMP PROBE: CPT | Performed by: HOSPITALIST

## 2024-12-13 PROCEDURE — 63600175 PHARM REV CODE 636 W HCPCS: Mod: JZ,JG | Performed by: STUDENT IN AN ORGANIZED HEALTH CARE EDUCATION/TRAINING PROGRAM

## 2024-12-13 PROCEDURE — 99233 SBSQ HOSP IP/OBS HIGH 50: CPT | Mod: ,,, | Performed by: HOSPITALIST

## 2024-12-13 RX ADMIN — CEPHALEXIN 500 MG: 250 FOR SUSPENSION ORAL at 10:12

## 2024-12-13 RX ADMIN — CLINDAMYCIN PHOSPHATE 600 MG: 600 INJECTION, SOLUTION INTRAVENOUS at 02:12

## 2024-12-13 RX ADMIN — CEPHALEXIN 500 MG: 250 FOR SUSPENSION ORAL at 08:12

## 2024-12-13 RX ADMIN — CLOTRIMAZOLE AND BETAMETHASONE DIPROPIONATE: 10; .5 CREAM TOPICAL at 08:12

## 2024-12-13 NOTE — PROGRESS NOTES
Infectious Disease  Patient Name Loly Ramirez  89 y.o. female.  MRN: [id number]   Length of stay: 2  Chief Complaint: Cellulitis (Family member reporting cellulitis to pt's right foot x2 wks, started abx on Sunday without improvement told to come to ER by physician. Denies fever.)    Interval history:   12/12 afebrile. SSTI is improving, there is minimal erythema today with some lateral malleolus swelling and tenderenss. She is ambulating without difficulty. SSTI of  right foot, intolerance to oral abx outpatient, admitted for IV therapy. Daughter concerned because mother will not cooperate with oral abx due to dementia. During exam patient was also quite uncooperative. Will trial cephalexin liquid, if unsuccessful will continue IV administration. Limited options due to allergy.   12/13 - afebrile. Failed oral trial. SSTI is nearly resolved but patietn still has signficant swelling of lateral malleolus with point tenderness. I recommended ACE wrap however she is not tolerating it all. Patient may have an ankle sprain. No witnessed injury per daughter. Would consult Podiatry for evaluation. Can continue clindamycin another 24-48 hrs  while admitted as she cellulitis has nearly resolved by  my assessment. ID will sign off, call back if needed.   Assessment and Plan:   1)  SSTI  - right foot with minimal erythema, there is ankle swelling  - BCx 12/10 - ngtd  - PCN allergy   - s/p cefazolin, vancomycin   - currently on clindamycin IV, continue 12/10 to 12/15 estimated   - trail of liquid oral cephalexin failedf on 12/13     2) Ankle swelling   - right ankle with significant swelling  - elevate  - not tolerating ACE wrap  - even with treatment of SSTI still with significant swelling and point tenderness over lateral malleolus  - advise podiatry consultation       3) Dementia  - at risk for falls  - impediment to care     r) PCN allergy   - unspecified reaction  - tolerated cephalosporin in ER  - will trial     Discussed  with patient and daughter at bedside 12/13   Discussed and seen with RN     Kristy Albrecht MD, MPH  [facility]     Thank you for this consultation. I will follow up with the patient. Please contact via Epic secure chat with any questions.         HPI:   Patient is Loly Ramirez a 89 y.o. female admitted on 12/10 with complaint of pain, redness and swelling for 2 weeks. Patient sough medical attention for the same with PCP who prescribed clindamycin but she could not tolerate and was therefore sent to the hospital. Upon evaluation patient with SSTI of foot. She is on empiric coverage. Patient has known hypertension,Alzheimer's s/p tubal ligation and cholecystectomy.   Infectious diseases consulted for evaluation and management.        Past Medical History:   Diagnosis Date    HTN (hypertension)      Past Surgical History:   Procedure Laterality Date    CHOLECYSTECTOMY      TUBAL LIGATION       Review of patient's allergies indicates:   Allergen Reactions    Penicillins      Current Outpatient Medications   Medication Instructions    clindamycin (CLEOCIN) 150 mg, 3 times daily    sulfamethoxazole-trimethoprim 200-40 mg/5 ml (BACTRIM,SEPTRA) 200-40 mg/5 mL Susp 8 mg/kg/day, Every 12 hours       Current Facility-Administered Medications:     acetaminophen tablet 650 mg, 650 mg, Oral, Q6H PRN, Jaja Hopkins, RODRICKP-BC    cephALEXin 250 mg/5 mL suspension 500 mg, 500 mg, Oral, Q12H, Kristy Albrecht MD, 500 mg at 12/12/24 2026    clindamycin in D5W 600 mg/50 mL IVPB 600 mg, 600 mg, Intravenous, Q8H, Jenny Govea DO, Stopped at 12/13/24 0252    clotrimazole-betamethasone 1-0.05% cream, , Topical (Top), QHS, Kristy Albrecht MD, Given at 12/12/24 2100    enoxaparin injection 30 mg, 30 mg, Subcutaneous, Q24H (prophylaxis, 1700), DimaxTaJenny,     ondansetron injection 4 mg, 4 mg, Intravenous, Q4H PRN, Jaja Hopkins, AGACNP-BC  Review of Systems    Objective:  "  Temp:  [97.1 °F (36.2 °C)-97.9 °F (36.6 °C)] 97.8 °F (36.6 °C)  Pulse:  [66-93] 72  Resp:  [18] 18  SpO2:  [98 %-100 %] 99 %  BP: (102-146)/(47-74) 105/51     Physical Exam    Estimated Creatinine Clearance: 12.3 mL/min (A) (based on SCr of 1.97 mg/dL (H)).  No results for input(s): "WBC", "PLT", "CREAT", "PROCAL" in the last 48 hours.    Invalid input(s): "HEM"  Microbiology Results (last 7 days)       Procedure Component Value Units Date/Time    Blood culture #1 **CANNOT BE ORDERED STAT** [[id number]]  (Normal) Collected: 12/10/24 1139    Order Status: Completed Specimen: Blood Updated: 12/12/24 1300     Blood Culture No Growth At 48 Hours    Blood culture #2 **CANNOT BE ORDERED STAT** [[id number]]  (Normal) Collected: 12/10/24 1139    Order Status: Completed Specimen: Blood Updated: 12/12/24 1300     Blood Culture No Growth At 48 Hours            Significant Labs: All pertinent labs within the past 24 hours have been reviewed.    Significant Imaging: I have reviewed all relevant and available imaging results/findings within the past 24 hours.      Plan -- see top of note    "

## 2024-12-13 NOTE — PLAN OF CARE
Rounded on patient and her daughter in regards to home health. Jessa would like to send referral to [facility]. FOC obtained and placed in chart. Referral faxed to [facility] ([phone]).    Patient has been accepted by [facility].

## 2024-12-13 NOTE — PROGRESS NOTES
[facility] Hospital Medicine Progress Note        Chief Complaint: Inpatient Follow-up for      HPI:   Patient is a 89-year-old female with past medical history of hypertension, Alzheimer disease who presents with right foot pain and swelling worsening over the last 2 weeks.  Patient was seeing her PCP and was prescribed clindamycin however states it took it for 1 day and could no longer tolerate the pills and therefore presented to the hospital.  Patient's daughter providing most of the history.  States that patient is currently not on any medications outpatient and has been doing much better without it.  No prior history of cellulitis.  Labs and vitals were largely unremarkable with no leukocytosis.  Patient also remained afebrile.  Mild CHLOE noted however no other significant electrolyte abnormalities.  Patient was admitted to us for cellulitis and further workup       Patient currently being managed for right foot cellulitis currently on IV clindamycin.Right lower extremity was negative for DVT      Today's information   Patient seen and examined at bedside, daughter at bedside   Podiatry was consulted per ID recs   Will get CT of the right foot for further evaluation         Case was discussed with patient's nurse and  on the floor.     Objective/physical exam:  General: In no acute distress, afebrile  Chest: Clear to auscultation bilaterally  Heart: RRR, +S1, S2, no appreciable murmur  Abdomen: Soft, nontender, BS +  MSK: Warm, warm right lower extremities with mild swelling  Neurologic: Alert and oriented x4, Cranial nerve II-XII intact, Strength 5/5 in all 4 extremities      Assessment/Plan:  Right foot cellulitis  CKD stage 4   History of Alzheimer's disease, hypertension, hyper lipidemia     Plan  Podiatry was consulted per ID recs   Will get CT of the right foot for further evaluation   Continue with antibiotics, per ID recs   Right lower extremity ultrasound  done, negative for any DVT   Home med rec completed, patient's daughter says that she is currently not on any home medications at this time even for hypertension or anything else   Consult PT and OT  Blood cultures normal to date  Renal function stable   Further recs based on clinical course     VTE prophylaxis:  Lovenox     Patient condition:  fair     Anticipated discharge and Disposition:   Likely return back home with daughter        All diagnosis and differential diagnosis have been reviewed; assessment and plan has been documented; I have personally reviewed the labs and test results that are presently available; I have reviewed the patients medication list; I have reviewed the consulting providers response and recommendations. I have reviewed or attempted to review medical records based upon their availability     All of the patient's questions have been  addressed and answered. Patient's is agreeable to the above stated plan. I will continue to monitor closely and make adjustments to medical management as needed.     Portions of this note dictated using EMR integrated voice recognition software, and may be subject to voice recognition errors not corrected at proofreading. Please contact writer for clarification if needed.   ___________________________________    All diagnosis and differential diagnosis have been reviewed; assessment and plan has been documented; I have personally reviewed the labs and test results that are presently available; I have reviewed the patients medication list; I have reviewed the consulting providers response and recommendations. I have reviewed or attempted to review medical records based upon their availability    All of the patient's questions have been  addressed and answered. Patient's is agreeable to the above stated plan. I will continue to monitor closely and make adjustments to medical management as needed.    Portions of this note dictated using EMR integrated voice  recognition software, and may be subject to voice recognition errors not corrected at proofreading. Please contact writer for clarification if needed.     VITAL SIGNS: 24 HRS MIN & MAX LAST   Temp  Min: 97.7 °F (36.5 °C)  Max: 98.4 °F (36.9 °C) 98.4 °F (36.9 °C)   BP  Min: 102/63  Max: 116/68 110/71   Pulse  Min: 56  Max: 69  69   Resp  Min: 18  Max: 18 18   SpO2  Min: 93 %  Max: 97 % (!) 93 %     I have reviewed the following labs:  Recent Labs   Lab 08/12/24 0441 08/13/24  0433 08/14/24  0500   WBC 7.19 5.66 5.53   RBC 4.95 4.46* 4.39*   HGB 14.8 13.6* 13.4*   HCT 43.5 39.0* 38.4*   MCV 87.9 87.4 87.5   MCH 29.9 30.5 30.5   MCHC 34.0 34.9 34.9   RDW 12.8 12.7 12.7    206 200   MPV 9.0 9.0 9.4     Recent Labs   Lab 08/12/24 0441 08/13/24  0433 08/14/24  0500 08/15/24  0508   * 133* 132* 135*   K 3.5 3.4* 3.3* 4.1   CL 97* 101 98 101   CO2 24 22* 23 22*   BUN 20.0 19.6 16.7 15.4   CREATININE 1.05 1.00 0.99 0.92   CALCIUM 9.3 9.0 8.9 9.2   MG 2.60 2.10 1.80 2.00   ALBUMIN 3.6 3.4 3.3*  --    ALKPHOS 112 103 97  --    ALT 22 19 20  --    AST 24 20 25  --    BILITOT 0.8 0.7 0.7  --      Microbiology Results (last 7 days)       ** No results found for the last 168 hours. **          _____________________________________________________________________    Malnutrition Status:    Scheduled Med:   apixaban  5 mg Oral BID    aspirin  81 mg Oral Daily    atorvastatin  40 mg Oral QHS    diltiaZEM  360 mg Oral Daily    docusate sodium  50 mg Oral BID    ergocalciferol  50,000 Units Oral Q7 Days    guaiFENesin  600 mg Oral BID    insulin glargine U-100  15 Units Subcutaneous QHS    QUEtiapine  150 mg Oral Nightly      Continuous Infusions:     PRN Meds:    Current Facility-Administered Medications:     acetaminophen, 1,000 mg, Oral, Q6H PRN    aluminum-magnesium hydroxide-simethicone, 30 mL, Oral, QID PRN    bisacodyL, 10 mg, Rectal, Daily PRN    dextrose 10%, 12.5 g, Intravenous, PRN    dextrose 10%, 25 g,  Intravenous, PRN    glucagon (human recombinant), 1 mg, Intramuscular, PRN    glucose, 16 g, Oral, PRN    glucose, 24 g, Oral, PRN    guaiFENesin 100 mg/5 ml, 200 mg, Oral, Q4H PRN    HYDROcodone-acetaminophen, 1 tablet, Oral, Q4H PRN    insulin aspart U-100, 1-10 Units, Subcutaneous, QID (AC + HS) PRN    melatonin, 6 mg, Oral, Nightly PRN    naloxone, 0.02 mg, Intravenous, PRN    ondansetron, 4 mg, Intravenous, Q4H PRN    prochlorperazine, 5 mg, Intravenous, Q6H PRN    senna-docusate 8.6-50 mg, 1 tablet, Oral, BID PRN    sodium chloride 0.9%, 10 mL, Intravenous, PRN     Radiology:  I have personally reviewed the following imaging and agree with the radiologist.     Cardiac catheterization  Procedure performed in the Invasive Lab    - See Procedure Log link below for nursing documentation    - See OpNote on Surgeries Tab for physician findings    - See Imaging Tab for radiologist dictation      Megan Barraza MD  Department of Hospital Medicine   [facility]   08/18/2024

## 2024-12-14 VITALS
WEIGHT: 120 LBS | TEMPERATURE: 98 F | OXYGEN SATURATION: 99 % | HEIGHT: 60 IN | BODY MASS INDEX: 23.56 KG/M2 | HEART RATE: 100 BPM | SYSTOLIC BLOOD PRESSURE: 137 MMHG | DIASTOLIC BLOOD PRESSURE: 72 MMHG | RESPIRATION RATE: 18 BRPM

## 2024-12-14 PROBLEM — M79.89 PAIN AND SWELLING OF RIGHT LOWER LEG: Status: ACTIVE | Noted: 2024-12-14

## 2024-12-14 PROBLEM — M79.661 PAIN AND SWELLING OF RIGHT LOWER LEG: Status: ACTIVE | Noted: 2024-12-14

## 2024-12-14 LAB — URATE SERPL-MCNC: 7 MG/DL (ref 2.6–6)

## 2024-12-14 PROCEDURE — 84550 ASSAY OF BLOOD/URIC ACID: CPT | Performed by: INTERNAL MEDICINE

## 2024-12-14 PROCEDURE — 25000003 PHARM REV CODE 250: Performed by: HOSPITALIST

## 2024-12-14 PROCEDURE — 36415 COLL VENOUS BLD VENIPUNCTURE: CPT | Performed by: INTERNAL MEDICINE

## 2024-12-14 RX ORDER — CEPHALEXIN 250 MG/5ML
500 POWDER, FOR SUSPENSION ORAL EVERY 12 HOURS
Qty: 80 ML | Refills: 0 | Status: SHIPPED | OUTPATIENT
Start: 2024-12-14 | End: 2024-12-18

## 2024-12-14 RX ORDER — POLYETHYLENE GLYCOL 3350 17 G/17G
17 POWDER, FOR SOLUTION ORAL 2 TIMES DAILY
Status: DISCONTINUED | OUTPATIENT
Start: 2024-12-14 | End: 2024-12-14 | Stop reason: HOSPADM

## 2024-12-14 RX ADMIN — CEPHALEXIN 500 MG: 250 FOR SUSPENSION ORAL at 08:12

## 2024-12-14 NOTE — CONSULTS
[facility]                       [address] [place] [address]    PATIENT NAME:       SHANTANU ZALDIVAR  YOB: 1929  CSN:                [id number]   MRN:                [id number]  ADMIT DATE:         12/10/2024 10:37:00  PHYSICIAN:          Bowen Montano DPM                            CONSULTATION    DATE OF CONSULT:  12/13/2024 00:00:00    HISTORY OF PRESENT ILLNESS:  Ms. Zaldivar is a 89-year-old female, admitted to the   hospital several days ago with apparently a 2-week history of redness, swelling,   and pain to the lateral aspect of her right ankle.  She had seen her primary   care physician and there was an attempt at oral antibiotics.  Unfortunately, the   patient was unable to take them and subsequently, the patient was sent to this   facility for further evaluation and treatment.  She has a known history of   dementia.  Her daughter is present today.  Daughter does not recollect any   history of trauma, although the patient does ambulate quite a bit at the house   for extended periods of time.  There has been no reported bites or stings that   she knows about.  There is no reported fevers or chills.  All lab work for the   most part has been unremarkable since admit.  The patient had x-rays and CT   scans, which other than some remote history of trauma to the 5th MPJ region,   which there were some reports of some subacute fracture of the 5th metatarsal   head.  No other issues reported.  The patient has no history of any   rheumatologic diseases that the daughter can recollect.  No other complaints.    PAST MEDICAL HISTORY:  Notable for hypertension and dementia.    PAST SURGICAL HISTORY:  Cholecystectomy, tubal ligation, and ORIF of left femur   hip fracture.    MEDICATIONS:  As per the chart.    ALLERGIES:  TO PENICILLIN.     SOCIAL HISTORY:  Denies tobacco, alcohol, IV drug abuse.    FAMILY HISTORY:   Noncontributory.    PHYSICAL EXAMINATION:  GENERAL:  Reveals a well-nourished elderly female, currently lying in bed.  She   is resting comfortably.  She is arousable.  CURRENT VITAL SIGNS:  Stable and she is afebrile.  HEART:  Deferred.  LUNGS:  Deferred.  ABDOMEN:  Deferred.  EXTREMITIES:  Vascular exam, the feet and legs are warm.  Palpable pedal pulses.    Good capillary refill.  NEUROLOGIC:  Difficult to determine accurately.  She does grimace with pain with   palpation and manipulation of the right ankle.  MUSCULOSKELETAL:  Reveals a plantigrade foot structure.  No contractures.    Again, she has tenderness primarily over the lateral aspect of the right ankle   around the peroneal tendons, lateral malleolus and just inferior with some noted   inflammatory changes to the area.  There is no evidence of any fluctuance,   crepitation, bogginess.  No sinus tracts.  No bullous areas in the particular   site.  Again, difficulty testing peroneal function.  She does not grimace with   any discomfort with manipulation around the 5th metatarsal head.    LABORATORY DATA:  Reviewed.  Labs are reviewed.  White cell counts have been   normal since admission, as is H and H.  BUN and creatinine 31 and 1.97 on last   report on 12/11.  Sed rate was 30 on admission.  Blood cultures have been   negative.  Again, x-rays of the foot and the ankle reveal some irregularity   around the 5th metatarsal head and generalized osteopenia.    CT scan reveals possibly subacute appearing fracture of the neck of the 5th   metatarsal.    ASSESSMENT:  Inflammatory process, right ankle, questionable cellulitic process   versus tenosynovitis or underlying inflammatory arthropathy.    PLAN:  The daughter was instructed of findings of physical exam.  Acute onset of   pain and swelling to the area in which the patient has still been able to   ambulate, but no reported trauma.  CT scan is negative for any fractures in and   around this area and really  does not have any pain around the area of suspicion   on the CT primarily at the 5th metatarsal.  We are going to get a uric acid   level along with MRI of the ankle for further evaluation.  Continue with the   antibiotics as per Infectious Disease.  No contrast is going to be given   secondary to her ongoing renal insufficiency.  We will continue to follow and I   will see her again tomorrow.      ______________________________  Bowen Montano DPM    GAS/AQS  DD:  12/13/2024  Time:  03:13PM  DT:  12/13/2024  Time:  07:22PM  Job #:  [id number]      CONSULTATION

## 2024-12-14 NOTE — PROGRESS NOTES
[facility]                       [address] [address]    PATIENT NAME:       SHANTANU ZALDIVAR  YOB: 1929  CSN:                [id number]   MRN:                [id number]  ADMIT DATE:         12/10/2024 10:37:00  PHYSICIAN:          Bowen Montano DPM                            PROGRESS NOTE    DATE:  12/14/2024 00:00:00    HISTORY:  Ms. Zaldivar is seen today.  She is actually up walking around the room   without any assistance.  Daughter is sitting in the chair.  No reported fevers   or chills.  She went down for MRI last night.  Unfortunately, she could not stay   still and the test was aborted.  Unfortunately, this was not passed along to   nursing or to myself.  We called down the MRI to find out all this information   this morning.  No other issues reported.    PHYSICAL EXAMINATION:  VITAL SIGNS:  Stable and afebrile.    LABORATORY DATA:  Uric acid level is elevated at 7.0.    Still with some inflammatory changes over the lateral ankle.  No pain around the   5th metatarsal head.  There are no inflammatory changes to this area.    ASSESSMENT:  Inflammatory process right ankle with, cellulitis versus   posttraumatic tenosynovitis or inflammatory arthritis, uric acid levels are   elevated.    PLAN:  We will try to get the MRI done again, may need some sedation to do this.    I would normally start her on Indocin or colchicine .  Unfortunately, she does   not like taking medications by mouth.  She has a history of chronic kidney   disease with her most GFR at 23.  Consideration for 1 dose of Toradol versus   steroids to see what kind of response.  Unfortunately, the steroids may also   exacerbate any sort of infectious process if it is present.  We will see if we   can get the MRI done earlier today.        ______________________________  Bowen Montano DPM    GAS/AQS  DD:  12/14/2024  Time:   10:46AM  DT:  12/14/2024  Time:  11:09AM  Job #:  [id number]      PROGRESS NOTE

## 2024-12-14 NOTE — PROGRESS NOTES
[facility] Hospital Medicine Progress Note        Chief Complaint: Inpatient Follow-up for      HPI:   Patient is a 89-year-old female with past medical history of hypertension, Alzheimer disease who presents with right foot pain and swelling worsening over the last 2 weeks.  Patient was seeing her PCP and was prescribed clindamycin however states it took it for 1 day and could no longer tolerate the pills and therefore presented to the hospital.  Patient's daughter providing most of the history.  States that patient is currently not on any medications outpatient and has been doing much better without it.  No prior history of cellulitis.  Labs and vitals were largely unremarkable with no leukocytosis.  Patient also remained afebrile.  Mild CHLOE noted however no other significant electrolyte abnormalities.  Patient was admitted to us for cellulitis and further workup       Patient currently being managed for right foot cellulitis currently on IV clindamycin.Right lower extremity was negative for DVT      Today's information   Vitals reviewed and stable on room air   Blood cultures negative at 72 hours   Podiatry recommendations noted - to check uric acid levels and MRI of the ankle  CT of the foot shows fracture of the neck of the 5th metatarsal bone     Case was discussed with patient's nurse and  on the floor.     Objective/physical exam:  Not examined      Assessment/Plan:  Fracture of the neck of the 5th metatarsal, right foot  Right foot cellulitis  CKD stage 4   History of Alzheimer's disease, hypertension, hyper lipidemia     Plan  Podiatry recommendations noted - to check uric acid levels and MRI of the ankle  CT of the foot shows fracture of the neck of the 5th metatarsal bone   Continue with antibiotics, per ID recs   Right lower extremity ultrasound done, negative for any DVT   Home med rec completed, patient's daughter says that she is currently not on any home  medications at this time even for hypertension or anything else   Consult PT and OT  Blood cultures normal to date  Renal function stable   Further recs based on clinical course     VTE prophylaxis:  Lovenox     Patient condition:  fair     Anticipated discharge and Disposition:   Likely return back home with daughter        All diagnosis and differential diagnosis have been reviewed; assessment and plan has been documented; I have personally reviewed the labs and test results that are presently available; I have reviewed the patients medication list; I have reviewed the consulting providers response and recommendations. I have reviewed or attempted to review medical records based upon their availability     All of the patient's questions have been  addressed and answered. Patient's is agreeable to the above stated plan. I will continue to monitor closely and make adjustments to medical management as needed.     Portions of this note dictated using EMR integrated voice recognition software, and may be subject to voice recognition errors not corrected at proofreading. Please contact writer for clarification if needed.   ___________________________________    All diagnosis and differential diagnosis have been reviewed; assessment and plan has been documented; I have personally reviewed the labs and test results that are presently available; I have reviewed the patients medication list; I have reviewed the consulting providers response and recommendations. I have reviewed or attempted to review medical records based upon their availability    All of the patient's questions have been  addressed and answered. Patient's is agreeable to the above stated plan. I will continue to monitor closely and make adjustments to medical management as needed.    Portions of this note dictated using EMR integrated voice recognition software, and may be subject to voice recognition errors not corrected at proofreading. Please contact  writer for clarification if needed.     VITAL SIGNS: 24 HRS MIN & MAX LAST   Temp  Min: 97.7 °F (36.5 °C)  Max: 98.4 °F (36.9 °C) 98.4 °F (36.9 °C)   BP  Min: 102/63  Max: 116/68 110/71   Pulse  Min: 56  Max: 69  69   Resp  Min: 18  Max: 18 18   SpO2  Min: 93 %  Max: 97 % (!) 93 %     I have reviewed the following labs:  Recent Labs   Lab 08/12/24 0441 08/13/24 0433 08/14/24  0500   WBC 7.19 5.66 5.53   RBC 4.95 4.46* 4.39*   HGB 14.8 13.6* 13.4*   HCT 43.5 39.0* 38.4*   MCV 87.9 87.4 87.5   MCH 29.9 30.5 30.5   MCHC 34.0 34.9 34.9   RDW 12.8 12.7 12.7    206 200   MPV 9.0 9.0 9.4     Recent Labs   Lab 08/12/24 0441 08/13/24 0433 08/14/24  0500 08/15/24  0508   * 133* 132* 135*   K 3.5 3.4* 3.3* 4.1   CL 97* 101 98 101   CO2 24 22* 23 22*   BUN 20.0 19.6 16.7 15.4   CREATININE 1.05 1.00 0.99 0.92   CALCIUM 9.3 9.0 8.9 9.2   MG 2.60 2.10 1.80 2.00   ALBUMIN 3.6 3.4 3.3*  --    ALKPHOS 112 103 97  --    ALT 22 19 20  --    AST 24 20 25  --    BILITOT 0.8 0.7 0.7  --      Microbiology Results (last 7 days)       ** No results found for the last 168 hours. **          _____________________________________________________________________    Malnutrition Status:    Scheduled Med:   apixaban  5 mg Oral BID    aspirin  81 mg Oral Daily    atorvastatin  40 mg Oral QHS    diltiaZEM  360 mg Oral Daily    docusate sodium  50 mg Oral BID    ergocalciferol  50,000 Units Oral Q7 Days    guaiFENesin  600 mg Oral BID    insulin glargine U-100  15 Units Subcutaneous QHS    QUEtiapine  150 mg Oral Nightly      Continuous Infusions:     PRN Meds:    Current Facility-Administered Medications:     acetaminophen, 1,000 mg, Oral, Q6H PRN    aluminum-magnesium hydroxide-simethicone, 30 mL, Oral, QID PRN    bisacodyL, 10 mg, Rectal, Daily PRN    dextrose 10%, 12.5 g, Intravenous, PRN    dextrose 10%, 25 g, Intravenous, PRN    glucagon (human recombinant), 1 mg, Intramuscular, PRN    glucose, 16 g, Oral, PRN    glucose, 24 g,  Oral, PRN    guaiFENesin 100 mg/5 ml, 200 mg, Oral, Q4H PRN    HYDROcodone-acetaminophen, 1 tablet, Oral, Q4H PRN    insulin aspart U-100, 1-10 Units, Subcutaneous, QID (AC + HS) PRN    melatonin, 6 mg, Oral, Nightly PRN    naloxone, 0.02 mg, Intravenous, PRN    ondansetron, 4 mg, Intravenous, Q4H PRN    prochlorperazine, 5 mg, Intravenous, Q6H PRN    senna-docusate 8.6-50 mg, 1 tablet, Oral, BID PRN    sodium chloride 0.9%, 10 mL, Intravenous, PRN     Radiology:  I have personally reviewed the following imaging and agree with the radiologist.     Cardiac catheterization  Procedure performed in the Invasive Lab    - See Procedure Log link below for nursing documentation    - See OpNote on Surgeries Tab for physician findings    - See Imaging Tab for radiologist dictation      Megan Barraza MD  Department of Hospital Medicine   [facility]   08/18/2024

## 2024-12-14 NOTE — NURSING
Notified Dr Barraza and Dr. Montano that patient's daughter is requesting to be discharged because of MRI wait. Notified via secure chat.

## 2024-12-15 LAB
BACTERIA BLD CULT: NORMAL
BACTERIA BLD CULT: NORMAL

## 2024-12-15 NOTE — DISCHARGE SUMMARY
[facility] Hospital Medicine Discharge Summary    Admit Date: 12/10/2024  Discharge Date and Time: 12/15/78412:52 PM  Admitting Physician:  Team  Discharging Physician: Megan Barraza MD.  Primary Care Physician: Velma Rae MD  Consults: Hospital Medicine, Infectious Disease, and podiatry     Discharge Diagnoses:  Fracture of the neck of the 5th metatarsal, right foot  Right foot cellulitis  CKD stage 4 , stable   History of Alzheimer's disease, hypertension, hyper lipidemia       Hospital Course:   Chief Complaint: Inpatient Follow-up for right foot swelling      HPI:   Patient is a 89-year-old female with past medical history of hypertension, Alzheimer disease who presents with right foot pain and swelling worsening over the last 2 weeks.  Patient was seeing her PCP and was prescribed clindamycin however states it took it for 1 day and could no longer tolerate the pills and therefore presented to the hospital.  Patient's daughter providing most of the history.  States that patient is currently not on any medications outpatient and has been doing much better without it.  No prior history of cellulitis.  Labs and vitals were largely unremarkable with no leukocytosis.  Patient also remained afebrile.  Mild CHLOE noted however no other significant electrolyte abnormalities.  Patient was admitted to us for cellulitis and further workup        Patient currently being managed for right foot cellulitis currently on IV clindamycin.Right lower extremity was negative for DVT    ID was consulted- recommended po suspension antibiotics due to difficulty swallowing pills and dementia  Podiatry recommendations noted - to check uric acid levels and MRI of the ankle  CT of the foot shows fracture of the neck of the 5th metatarsal bone    MRI foot was not completed   Daughter requested a discharge home as she could not wait for the mri foot  She will continue the po antibiotics for 4 more  days  And f/up with her pcp as needed         Objective/physical exam:  General: In no acute distress, afebrile  Chest: Clear to auscultation bilaterally  Heart: RRR, +S1, S2, no appreciable murmur  Abdomen: Soft, nontender, BS +  MSK: Warm, warm right lower extremities with mild swelling  Neurologic: Alert and oriented x4, Cranial nerve II-XII intact, Strength 5/5 in all 4 extremities       Pt was seen and examined on the day of discharge  Vitals:  VITAL SIGNS: 24 HRS MIN & MAX LAST   No data recorded 97.8 °F (36.6 °C)   BP  Min: 137/72  Max: 137/72 137/72   Pulse  Min: 100  Max: 100  100   Resp  Min: 18  Max: 18 18   SpO2  Min: 99 %  Max: 99 % 99 %         Procedures Performed: No admission procedures for hospital encounter.     Significant Diagnostic Studies: See Full reports for all details    Recent Labs   Lab 12/10/24  1055 12/11/24  0302   WBC 7.67 8.65   RBC 3.65* 3.66*   HGB 10.3* 10.3*   HCT 33.9* 33.1*   MCV 92.9 90.4   MCH 28.2 28.1   MCHC 30.4* 31.1*   RDW 13.6 13.5    228   MPV 10.0 10.3       Recent Labs   Lab 12/10/24  1055 12/11/24  0302    137   K 4.6 4.5    106   CO2 24 22*   BUN 34.2* 31.3*   CREATININE 1.96* 1.97*   CALCIUM 8.0* 8.6   ALBUMIN 3.9 4.1   ALKPHOS 69 67   ALT 9 9   AST 25 28   BILITOT 0.3 0.3        Microbiology Results (last 7 days)       ** No results found for the last 168 hours. **             CT Foot Without Contrast Right  Narrative: EXAMINATION:  CT FOOT WITHOUT CONTRAST RIGHT    CLINICAL HISTORY:  Foot pain, stress fracture suspected, neg xray;Foot swelling, nondiabetic, osteomyelitis suspected;    TECHNIQUE:  Helically acquired imaging through the right foot were obtained without the IV administration of contrast. Axial, sagittal and coronal reformations were created and interpreted.    Automated tube current modulation, weight-based exposure dosing, and/or iterative reconstruction technique utilized to reach lowest reasonably achievable exposure  rate.    DLP: 499 mGy*cm    COMPARISON:  Plain radiographs 12/10/2024    FINDINGS:  BONES/JOINTS: The bones are demineralized.  Subacute appearing ununited fracture at the neck of the 5th metatarsal.  No dislocation.  No appreciable stress reaction.    SOFT TISSUES: Atherosclerotic calcifications.  Lateral soft tissue swelling.    OTHER: N/A  Impression: 1. Subacute appearing fracture at the neck of the 5th metatarsal.    Electronically signed by: Sharifa Talbert  Date:    12/13/2024  Time:    13:06         Medication List        START taking these medications      cephALEXin 250 mg/5 mL suspension  Commonly known as: KEFLEX  Take 10 mLs (500 mg total) by mouth every 12 (twelve) hours. for 4 days            STOP taking these medications      clindamycin 150 MG capsule  Commonly known as: CLEOCIN     sulfamethoxazole-trimethoprim 200-40 mg/5 ml 200-40 mg/5 mL Susp  Commonly known as: BACTRIM,SEPTRA               Where to Get Your Medications        These medications were sent to [id number] - [place] - [address] AT [facility] [address] [facility] [place]  [address] [place] [address]      Phone: [phone]   cephALEXin 250 mg/5 mL suspension          Explained in detail to the patient about the discharge plan, medications, and follow-up visits. Pt understands and agrees with the treatment plan  Discharge Disposition: Home or Self Care   Discharged Condition: stable  Diet-    Medications Per DC med rec  Activities as tolerated   Follow-up Information       [facility] Follow up.    Why: Home health agency will contact you to schedule appointment  Contact information:  [address] [address] [phone]             Velma Rae MD. Schedule an appointment as soon as possible for a visit in 2 week(s).    Specialty: Family Medicine  Contact information:  [address] [place]  [place] [address]  [phone]                           For further questions contact hospitalist office    Discharge time 33 minutes    For worsening symptoms, chest pain, shortness of breath, increased abdominal pain, high grade fever, stroke or stroke like symptoms, immediately go to the nearest Emergency Room or call 911 as soon as possible.      Megan Ramirez M.D on 12/15/2024. at 1:52 PM.

## 2024-12-16 ENCOUNTER — PATIENT OUTREACH (OUTPATIENT)
Dept: ADMINISTRATIVE | Facility: CLINIC | Age: 89
End: 2024-12-16
Payer: MEDICARE

## 2024-12-16 NOTE — PROGRESS NOTES
C3 nurse spoke with Loly Ramirez 's daughter, Jessa for a TCC post hospital discharge follow up call. The patient does not have a scheduled HOSFU appointment with Velma Rae MD  within 5-7 days post hospital discharge date 12/14/24. C3 nurse was unable to schedule HOSFU appointment in [place].    Message sent to PCP staff requesting they contact patient and schedule follow up appointment.

## 2024-12-17 ENCOUNTER — OFFICE VISIT (OUTPATIENT)
Dept: FAMILY MEDICINE | Facility: CLINIC | Age: 89
End: 2024-12-17
Payer: MEDICARE

## 2024-12-17 VITALS
HEIGHT: 60 IN | WEIGHT: 120 LBS | BODY MASS INDEX: 23.56 KG/M2 | OXYGEN SATURATION: 94 % | HEART RATE: 66 BPM | DIASTOLIC BLOOD PRESSURE: 66 MMHG | SYSTOLIC BLOOD PRESSURE: 122 MMHG

## 2024-12-17 DIAGNOSIS — Z09 HOSPITAL DISCHARGE FOLLOW-UP: Primary | ICD-10-CM

## 2024-12-17 DIAGNOSIS — R21 RASH OF BACK: ICD-10-CM

## 2024-12-17 DIAGNOSIS — L03.115 CELLULITIS OF FOOT, RIGHT: ICD-10-CM

## 2024-12-17 DIAGNOSIS — N17.9 ACUTE KIDNEY INJURY: ICD-10-CM

## 2024-12-17 NOTE — PROGRESS NOTES
Transitional Care Note    Subjective:      Patient ID: Loly Ramirez is a 89 y.o. female.  Chief Complaint: Discharge Diagnosis: Fracture of the neck of the 5th metatarsal, right foot, Right foot cellulitis  Admit: 12/10/2024     Discharge: 12/15/2024    Family and/or Caretaker present at visit?  Yes, daughter Jessa.  Diagnostic tests reviewed/disposition: I have reviewed all completed as well as pending diagnostic tests at the time of discharge.  Home health/community services discussion/referrals: Patient does have home health established from hospital visit.    Establishment or re-establishment of referral orders for community resources: No other community resources needed at this time.   Discussion with other health care providers: No discussion with other health care providers necessary.     HPI  Patient is a 89-year-old female with past medical history of hypertension, Alzheimer disease. The patient presented to the ED with c/o right right foot pain and swelling worsening over the last 3-4 weeks. Per patient's daughter, the patient bumped her fifth metatarsal approximately 2 weeks ago. Labs and vitals were largely unremarkable with no leukocytosis. Patient also remained afebrile. Patient had an a CHLOE but no significant electrolyte abnormalities. The patient was admitted for cellulitis and CHLOE. The patient received IV clindamycin for cellulitis. Right lower extremity was negative for deep vein thrombosis. Infectious Disease was consulted and recommended po suspension antibiotics due to difficulty swallowing pills and dementia. Podiatry was consulted and recommended uric acid level and MRI ankle. Uric acid level mildly elevated. Patient could not complete MRI ankle. CT of the right foot showed fracture at the neck of the 5th metatarsal bone. Per daughter, patient was experiencing increase agitation and confusion and she requested for discharged home. The patient is scheduled to follow up with Podiatry 1/2025.The  patient was discharged home on p.o. suspension antibiotic.    Today the patients's daughter reports the patient is taking Keflex liquid by mouth with out difficulty or medication side effects. Right foot swelling wax and wanes. The patient does elevate her foot for short periods of time. The patient daughter reports the patient does not take any medications on a daily basis. The patient daughter reports a rash on the patient's right upper back. Reports itching, denies pain. The patient's daughter has no other concerns at this time.    Review of Systems   Constitutional:  Negative for appetite change, fatigue and fever.   HENT: Negative.     Eyes: Negative.    Respiratory:  Negative for cough and shortness of breath.    Cardiovascular:  Negative for palpitations and leg swelling.   Gastrointestinal:  Negative for abdominal pain, blood in stool, constipation, diarrhea, nausea and vomiting.   Endocrine: Negative for polydipsia, polyphagia and polyuria.   Genitourinary:  Negative for dysuria, frequency and urgency.   Musculoskeletal:         Right foot mild erythema and swelling.   Skin:  Positive for rash (itchy).   Neurological:  Negative for dizziness and tremors.   Psychiatric/Behavioral:  Positive for confusion (Alzheimer).          Objective:      Physical Exam  Constitutional:       General: She is not in acute distress.     Appearance: She is not ill-appearing.   HENT:      Mouth/Throat:      Mouth: Mucous membranes are moist.      Pharynx: Oropharynx is clear.   Eyes:      General: No scleral icterus.     Extraocular Movements: Extraocular movements intact.      Pupils: Pupils are equal, round, and reactive to light.   Cardiovascular:      Rate and Rhythm: Normal rate and regular rhythm.      Pulses:           Dorsalis pedis pulses are 1+ on the right side and 1+ on the left side.        Posterior tibial pulses are 2+ on the right side and 2+ on the left side.      Heart sounds: Normal heart sounds.    Pulmonary:      Effort: Pulmonary effort is normal.      Breath sounds: Normal breath sounds.   Abdominal:      General: Bowel sounds are normal.      Palpations: Abdomen is soft.   Musculoskeletal:         General: Swelling: right foot swelling. Normal range of motion.      Cervical back: Normal range of motion. No rigidity.   Feet:      Right foot:      Skin integrity: Erythema (mild erythema) present.      Left foot:      Skin integrity: Skin integrity normal.   Lymphadenopathy:      Cervical: No cervical adenopathy.   Skin:     General: Skin is warm and dry.      Findings: Rash present. Rash is scaling.      Comments: Right upper back - scaling, flaking, bumps resembling pimples. Mild erythema.   Neurological:      Mental Status: She is alert. She is disoriented.      Gait: Gait abnormal.           Assessment:       1. Hospital discharge follow-up    2. Cellulitis of foot, right    3. Acute kidney injury    4. Rash of back          Plan:       1. Hospital discharge follow-up      -Keep follow up appointment as scheduled with podiatry.      -Go to the emergency department if symptoms of chest pain/tightness, shortness of breath, increased pain, fall, change in level of        consciousness, fever/chills, temp >100.4 or any acute illness.     2. Cellulitis of foot, right      -Improving, continue antibiotic as prescribed. Complete entire course of antibiotic treatment.      -Notify the office of increased swelling, erythema, pain.       -Fall precautions discussed.    3. Acute kidney injury      -Avoid NSAIDs (Aleve, Mobic, Celebrex, Ibuprofen, Advil, Toradol and Diclofenac). Only take tylenol occasionally if needed for        aches/pains.      -Stressed the importance of following a low-sodium diet.      -Avoid excessive intake of high potassium foods such as bananas, oranges, watermelon, tomatoes, potatoes, or salt substitutes.      -Keep follow up appointment with nephrology as scheduled.    4. Rash of back       -Apply clotrimazole betamethasone 1-0.05% (LOTRISONE) cream. Apply topically 2 (two) times daily.  Avoid scratching.      -Return to the clinic in 2 weeks or sooner if rash does not improve.

## 2024-12-18 RX ORDER — CLOTRIMAZOLE AND BETAMETHASONE DIPROPIONATE 10; .64 MG/G; MG/G
CREAM TOPICAL 2 TIMES DAILY
Qty: 15 G | Refills: 0 | Status: SHIPPED | OUTPATIENT
Start: 2024-12-18

## 2024-12-23 PROBLEM — Z09 HOSPITAL DISCHARGE FOLLOW-UP: Status: ACTIVE | Noted: 2024-12-23

## 2024-12-23 PROBLEM — R21 RASH OF BACK: Status: ACTIVE | Noted: 2024-12-23

## 2024-12-23 PROBLEM — L03.115 CELLULITIS OF FOOT, RIGHT: Status: ACTIVE | Noted: 2024-12-23

## 2024-12-31 PROBLEM — N17.9 ACUTE KIDNEY INJURY: Status: ACTIVE | Noted: 2024-12-31

## 2025-01-01 NOTE — ASSESSMENT & PLAN NOTE
Keep follow up appointment as scheduled with podiatry.  Go to the emergency department if symptoms of chest pain/tightness, shortness of breath, increased pain, fall, change in level of consciousness, fever/chills, temp >100.4 or any acute illness.

## 2025-01-01 NOTE — ASSESSMENT & PLAN NOTE
-Avoid NSAIDs (Aleve, Mobic, Celebrex, Ibuprofen, Advil, Toradol and Diclofenac). Only take tylenol occasionally if needed for aches/pains.  -Stressed the importance of following a low-sodium diet.  -Avoid excessive intake of high potassium foods such as bananas, oranges, watermelon, tomatoes, potatoes, or salt substitutes.  -Keep follow up appointment with nephrology as scheduled.

## 2025-01-01 NOTE — ASSESSMENT & PLAN NOTE
Apply clotrimazole betamethasone as prescribed.  Avoid scratching.  Return to the clinic in 2 weeks or sooner if rash does not improve.

## 2025-01-01 NOTE — ASSESSMENT & PLAN NOTE
Improving, continue antibiotic as prescribed. Complete entire course of antibiotic treatment.  Notify the office of increased swelling, erythema, pain.   Fall precautions discussed.

## 2025-01-09 DIAGNOSIS — N18.4 CKD (CHRONIC KIDNEY DISEASE) STAGE 4, GFR 15-29 ML/MIN: Primary | ICD-10-CM

## 2025-01-15 ENCOUNTER — LAB REQUISITION (OUTPATIENT)
Dept: LAB | Facility: HOSPITAL | Age: OVER 89
End: 2025-01-15
Payer: MEDICARE

## 2025-01-15 DIAGNOSIS — I12.9 HYPERTENSIVE CHRONIC KIDNEY DISEASE WITH STAGE 1 THROUGH STAGE 4 CHRONIC KIDNEY DISEASE, OR UNSPECIFIED CHRONIC KIDNEY DISEASE: ICD-10-CM

## 2025-01-15 DIAGNOSIS — N18.4 CHRONIC KIDNEY DISEASE, STAGE 4 (SEVERE): ICD-10-CM

## 2025-01-15 LAB
ALBUMIN SERPL-MCNC: 3.3 G/DL (ref 3.4–4.8)
ALBUMIN/GLOB SERPL: 1.4 RATIO (ref 1.1–2)
ALP SERPL-CCNC: 72 UNIT/L (ref 40–150)
ALT SERPL-CCNC: 16 UNIT/L (ref 0–55)
ANION GAP SERPL CALC-SCNC: 9 MEQ/L
AST SERPL-CCNC: 34 UNIT/L (ref 5–34)
BACTERIA #/AREA URNS AUTO: ABNORMAL /HPF
BASOPHILS # BLD AUTO: 0.05 X10(3)/MCL
BASOPHILS NFR BLD AUTO: 0.7 %
BILIRUB SERPL-MCNC: 0.2 MG/DL
BILIRUB UR QL STRIP.AUTO: NEGATIVE
BUN SERPL-MCNC: 35.2 MG/DL (ref 9.8–20.1)
CALCIUM SERPL-MCNC: 8.7 MG/DL (ref 8.4–10.2)
CHLORIDE SERPL-SCNC: 116 MMOL/L (ref 98–111)
CLARITY UR: CLEAR
CO2 SERPL-SCNC: 19 MMOL/L (ref 23–31)
COLOR UR AUTO: ABNORMAL
CREAT SERPL-MCNC: 2.15 MG/DL (ref 0.55–1.02)
CREAT UR-MCNC: 159.7 MG/DL (ref 45–106)
CREAT/UREA NIT SERPL: 16
EOSINOPHIL # BLD AUTO: 0.08 X10(3)/MCL (ref 0–0.9)
EOSINOPHIL NFR BLD AUTO: 1.1 %
ERYTHROCYTE [DISTWIDTH] IN BLOOD BY AUTOMATED COUNT: 13.6 % (ref 11.5–17)
GFR SERPLBLD CREATININE-BSD FMLA CKD-EPI: 21 ML/MIN/1.73/M2
GLOBULIN SER-MCNC: 2.4 GM/DL (ref 2.4–3.5)
GLUCOSE SERPL-MCNC: 145 MG/DL (ref 75–121)
GLUCOSE UR QL STRIP: NEGATIVE
HCT VFR BLD AUTO: 31.5 % (ref 37–47)
HGB BLD-MCNC: 9.7 G/DL (ref 12–16)
HGB UR QL STRIP: ABNORMAL
HYALINE CASTS URNS QL MICRO: ABNORMAL /LPF
IMM GRANULOCYTES # BLD AUTO: 0.02 X10(3)/MCL (ref 0–0.04)
IMM GRANULOCYTES NFR BLD AUTO: 0.3 %
KETONES UR QL STRIP: NEGATIVE
LEUKOCYTE ESTERASE UR QL STRIP: NEGATIVE
LYMPHOCYTES # BLD AUTO: 1.55 X10(3)/MCL (ref 0.6–4.6)
LYMPHOCYTES NFR BLD AUTO: 21.2 %
MCH RBC QN AUTO: 28.8 PG (ref 27–31)
MCHC RBC AUTO-ENTMCNC: 30.8 G/DL (ref 33–36)
MCV RBC AUTO: 93.5 FL (ref 80–94)
MONOCYTES # BLD AUTO: 0.54 X10(3)/MCL (ref 0.1–1.3)
MONOCYTES NFR BLD AUTO: 7.4 %
NEUTROPHILS # BLD AUTO: 5.08 X10(3)/MCL (ref 2.1–9.2)
NEUTROPHILS NFR BLD AUTO: 69.3 %
NITRITE UR QL STRIP: NEGATIVE
NRBC BLD AUTO-RTO: 0 %
PH UR STRIP: 5.5 [PH]
PHOSPHATE SERPL-MCNC: 3 MG/DL (ref 2.3–4.7)
PLATELET # BLD AUTO: 199 X10(3)/MCL (ref 130–400)
PMV BLD AUTO: 10.5 FL (ref 7.4–10.4)
POTASSIUM SERPL-SCNC: 4.5 MMOL/L (ref 3.5–5.1)
PROT SERPL-MCNC: 5.7 GM/DL (ref 5.8–7.6)
PROT UR QL STRIP: ABNORMAL
PROT UR STRIP-MCNC: 20.1 MG/DL
RBC # BLD AUTO: 3.37 X10(6)/MCL (ref 4.2–5.4)
RBC #/AREA URNS AUTO: ABNORMAL /HPF
SODIUM SERPL-SCNC: 144 MMOL/L (ref 136–145)
SP GR UR STRIP.AUTO: 1.02 (ref 1–1.03)
SQUAMOUS #/AREA URNS AUTO: ABNORMAL /HPF
URINE PROTEIN/CREATININE RATIO (OLG): 0.1
UROBILINOGEN UR STRIP-ACNC: 0.2
WBC # BLD AUTO: 7.32 X10(3)/MCL (ref 4.5–11.5)
WBC #/AREA URNS AUTO: ABNORMAL /HPF

## 2025-01-15 PROCEDURE — 81003 URINALYSIS AUTO W/O SCOPE: CPT | Performed by: INTERNAL MEDICINE

## 2025-01-15 PROCEDURE — 82570 ASSAY OF URINE CREATININE: CPT | Performed by: INTERNAL MEDICINE

## 2025-01-15 PROCEDURE — 85025 COMPLETE CBC W/AUTO DIFF WBC: CPT | Performed by: INTERNAL MEDICINE

## 2025-01-15 PROCEDURE — 87086 URINE CULTURE/COLONY COUNT: CPT | Performed by: INTERNAL MEDICINE

## 2025-01-15 PROCEDURE — 84100 ASSAY OF PHOSPHORUS: CPT | Performed by: INTERNAL MEDICINE

## 2025-01-15 PROCEDURE — 80053 COMPREHEN METABOLIC PANEL: CPT | Performed by: INTERNAL MEDICINE

## 2025-01-18 LAB
BACTERIA UR CULT: ABNORMAL
BACTERIA UR CULT: ABNORMAL

## 2025-02-25 ENCOUNTER — DOCUMENT SCAN (OUTPATIENT)
Dept: HOME HEALTH SERVICES | Facility: HOSPITAL | Age: OVER 89
End: 2025-02-25
Payer: MEDICARE

## 2025-03-12 ENCOUNTER — LAB REQUISITION (OUTPATIENT)
Dept: LAB | Facility: HOSPITAL | Age: OVER 89
End: 2025-03-12
Payer: MEDICARE

## 2025-03-12 ENCOUNTER — RESULTS FOLLOW-UP (OUTPATIENT)
Dept: FAMILY MEDICINE | Facility: CLINIC | Age: OVER 89
End: 2025-03-12

## 2025-03-12 DIAGNOSIS — N39.0 URINARY TRACT INFECTION, SITE NOT SPECIFIED: ICD-10-CM

## 2025-03-12 DIAGNOSIS — N30.00 ACUTE CYSTITIS WITHOUT HEMATURIA: Primary | ICD-10-CM

## 2025-03-12 LAB
BILIRUB UR QL STRIP.AUTO: NEGATIVE
CLARITY UR: CLEAR
COLOR UR AUTO: NORMAL
GLUCOSE UR QL STRIP: NEGATIVE
HGB UR QL STRIP: NEGATIVE
KETONES UR QL STRIP: NEGATIVE
LEUKOCYTE ESTERASE UR QL STRIP: NEGATIVE
NITRITE UR QL STRIP: NEGATIVE
PH UR STRIP: 6 [PH]
PROT UR QL STRIP: NEGATIVE
SP GR UR STRIP.AUTO: 1.02 (ref 1–1.03)
UROBILINOGEN UR STRIP-ACNC: 0.2

## 2025-03-12 PROCEDURE — 87186 SC STD MICRODIL/AGAR DIL: CPT

## 2025-03-12 PROCEDURE — 81003 URINALYSIS AUTO W/O SCOPE: CPT

## 2025-03-13 RX ORDER — NITROFURANTOIN 25; 75 MG/1; MG/1
100 CAPSULE ORAL 2 TIMES DAILY
Qty: 14 CAPSULE | Refills: 0 | Status: SHIPPED | OUTPATIENT
Start: 2025-03-13 | End: 2025-03-17 | Stop reason: SDUPTHER

## 2025-03-13 NOTE — PROGRESS NOTES
Start and Complete full course of antibiotics as prescribed.  Report any continuing signs such as nausea/vomiting, low back pain, blood in urine, burning with urination after antibiotic completion, or fever.  Drink plenty of water daily. Avoid soda.  Women wipe front to back, urinate after sexual intercourse, and avoid irritating feminine products.  Urinate frequently. Do not hold urine for extended periods of time.    Wear cotton underwear and avoid tight fitting pants.   Use OTC AZO for relief of urinary spasms.

## 2025-03-15 LAB — BACTERIA UR CULT: ABNORMAL

## 2025-03-17 ENCOUNTER — TELEPHONE (OUTPATIENT)
Dept: FAMILY MEDICINE | Facility: CLINIC | Age: OVER 89
End: 2025-03-17
Payer: MEDICARE

## 2025-03-17 DIAGNOSIS — N30.00 ACUTE CYSTITIS WITHOUT HEMATURIA: Primary | ICD-10-CM

## 2025-03-17 RX ORDER — CIPROFLOXACIN 250 MG/5ML
500 KIT ORAL 2 TIMES DAILY
Qty: 140 ML | Refills: 0 | Status: SHIPPED | OUTPATIENT
Start: 2025-03-17 | End: 2025-03-24

## 2025-03-17 RX ORDER — NITROFURANTOIN 25 MG/5ML
100 SUSPENSION ORAL EVERY 12 HOURS
Qty: 280 ML | Refills: 0 | Status: SHIPPED | OUTPATIENT
Start: 2025-03-17 | End: 2025-03-17

## 2025-03-17 RX ORDER — LEVOFLOXACIN 500 MG/1
500 TABLET, FILM COATED ORAL DAILY
Qty: 7 TABLET | Refills: 0 | Status: SHIPPED | OUTPATIENT
Start: 2025-03-17 | End: 2025-03-17 | Stop reason: SDUPTHER

## 2025-03-17 NOTE — TELEPHONE ENCOUNTER
Called pt to discuss lab results. Pt voiced understanding and thanks!     Pt's daughter voiced pt can not take pills and is asking for liquid antibiotic. Thanks!

## 2025-03-17 NOTE — TELEPHONE ENCOUNTER
----- Message from Velma Rae MD sent at 3/13/2025  6:00 PM CDT -----  Start and Complete full course of antibiotics as prescribed.  Report any continuing signs such as nausea/vomiting, low back pain, blood in urine, burning with urination after antibiotic completion, or fever.  Drink plenty of water daily. Avoid soda.  Women wipe front to back, urinate after sexual intercourse, and avoid irritating feminine products.  Urinate frequently. Do not hold urine for extended periods of time.    Wear cotton underwear and avoid tight fitting pants.   Use OTC AZO for relief of urinary spasms.   ----- Message -----  From: Lab, Background User  Sent: 3/12/2025   3:36 PM CDT  To: Velma Rae MD

## 2025-03-17 NOTE — TELEPHONE ENCOUNTER
Pt's daughter voiced her mom will not take it in a cup dissolved. She's asking can a home health nurse give it to her in an IV?

## 2025-03-17 NOTE — PROGRESS NOTES
I just now sent nitrofurantoin to the pharmacy.  After reviewing culture and sensitivity results patient is resistant to nitrofurantoin discontinue nitrofurantoin and start Levaquin as prescribed.  I do not have the suspension form of Levaquin as an option.  She can dissolve the tablet in 1 mL of liquid

## 2025-03-17 NOTE — TELEPHONE ENCOUNTER
----- Message from Bridgett sent at 3/17/2025  3:51 PM CDT -----  Who Called: Loly SONYA Jones is requesting assistance/information from provider's office.Symptoms (please be specific):  How long has patient had these symptoms:  List of preferred pharmacies on file (remove unneeded): [unfilled]If different, enter pharmacy into here including location and phone number: Preferred Method of Contact: Phone CallPatient's Preferred Phone Number on File: [phone] Best Call Back Number, if different:Additional Information: Pt's daughter called requesting to speak with nurse about getting a liquid antibiotic instead of levoFLOXacin (LEVAQUIN) 500 MG tablet

## 2025-03-19 ENCOUNTER — DOCUMENTATION ONLY (OUTPATIENT)
Dept: FAMILY MEDICINE | Facility: CLINIC | Age: OVER 89
End: 2025-03-19
Payer: MEDICARE

## 2025-03-25 DIAGNOSIS — N30.00 ACUTE CYSTITIS WITHOUT HEMATURIA: ICD-10-CM

## 2025-03-25 RX ORDER — CIPROFLOXACIN 250 MG/5ML
500 KIT ORAL 2 TIMES DAILY
Qty: 140 ML | Refills: 0 | Status: SHIPPED | OUTPATIENT
Start: 2025-03-25 | End: 2025-04-01

## 2025-03-25 RX ORDER — CIPROFLOXACIN 250 MG/5ML
500 KIT ORAL 2 TIMES DAILY
Qty: 140 ML | Refills: 0 | Status: SHIPPED | OUTPATIENT
Start: 2025-03-25 | End: 2025-03-25 | Stop reason: SDUPTHER

## 2025-03-25 NOTE — TELEPHONE ENCOUNTER
A drug change request has been requested by the pt's insurance. These are the alternatives drugs: Alternative drug therapy preferred product required.

## 2025-03-26 ENCOUNTER — TELEPHONE (OUTPATIENT)
Dept: FAMILY MEDICINE | Facility: CLINIC | Age: OVER 89
End: 2025-03-26
Payer: MEDICARE

## 2025-03-26 NOTE — TELEPHONE ENCOUNTER
Called Vicky back and voiced pt will need an appointment for Antibiotic injections by home health nurse.

## 2025-03-26 NOTE — TELEPHONE ENCOUNTER
----- Message from Radha sent at 3/26/2025 10:35 AM CDT -----  Regarding: call back  .Who Called:Cecy is requesting assistance/information from provider's office.Symptoms (please be specific):  How long has patient had these symptoms:  List of preferred pharmacies on file (remove unneeded): [unfilled]If different, enter pharmacy into here including location and phone number: Preferred Method of Contact: Phone CallPatient's Preferred Phone Number on File: [phone] Best Call Back Number, if different:Additional Information: Vicky wan/ [facility] requesting a call back about pt meds Vicky states that she has made several attempts  call back  [phone]

## 2025-05-06 NOTE — ED NOTES
----- Message from Gloria sent at 5/6/2025  3:40 PM CDT -----  Contact: [facility]  .Type:  RX Refill RequestWho Called: [facility]Refill or New Rx:RX Name and Strength:amoxicillin-clavulanate 875-125mg (AUGMENTIN) 875-125 mg per tabletHow is the patient currently taking it? (ex. 1XDay):Is this a 30 day or 90 day RX:Preferred Pharm[facility] with phone number[facility]: [phone] or Mail Order:localOrdering Provider:hongWould the patient rather a call back or a response via [facility]? Call backBest Call Back Number:[phone] - nurse of ms rodriguezAdditional Information: RACHELLE BRANHAM is calling to request that medication amoxicillin-clavulanate 875-125mg (AUGMENTIN) 875-125 mg per tablet be called in to pharmacy of their choice listed above.   Bed: 08  Expected date:   Expected time:   Means of arrival:   Comments:  Ashley

## 2025-07-02 ENCOUNTER — PATIENT OUTREACH (OUTPATIENT)
Facility: CLINIC | Age: OVER 89
End: 2025-07-02
Payer: MEDICARE
